# Patient Record
Sex: FEMALE | HISPANIC OR LATINO | Employment: UNEMPLOYED | URBAN - METROPOLITAN AREA
[De-identification: names, ages, dates, MRNs, and addresses within clinical notes are randomized per-mention and may not be internally consistent; named-entity substitution may affect disease eponyms.]

---

## 2022-06-24 ENCOUNTER — TELEPHONE (OUTPATIENT)
Dept: OBGYN | Facility: CLINIC | Age: 32
End: 2022-06-24

## 2022-06-24 DIAGNOSIS — Z32.01 PREGNANCY TEST POSITIVE: Primary | ICD-10-CM

## 2022-06-24 NOTE — TELEPHONE ENCOUNTER
[facility] Call [facility]    Phone Message    May a detailed message be left on voicemail: yes     Reason for Call: Order(s): Other:   Reason for requested: US new obi  Date needed: 7/1/22  Provider name: Linda Grayson      Action Taken: Message routed to:  [facility] ([facility]): meera    Travel Screening: Not Applicable

## 2022-07-05 ENCOUNTER — TELEPHONE (OUTPATIENT)
Dept: OBGYN | Facility: CLINIC | Age: 32
End: 2022-07-05

## 2022-07-21 ENCOUNTER — OFFICE VISIT (OUTPATIENT)
Dept: OBGYN | Facility: CLINIC | Age: 32
End: 2022-07-21
Attending: MIDWIFE
Payer: COMMERCIAL

## 2022-07-21 ENCOUNTER — ANCILLARY PROCEDURE (OUTPATIENT)
Dept: ULTRASOUND IMAGING | Facility: CLINIC | Age: 32
End: 2022-07-21
Attending: MIDWIFE
Payer: COMMERCIAL

## 2022-07-21 VITALS
HEIGHT: 62 IN | HEART RATE: 77 BPM | SYSTOLIC BLOOD PRESSURE: 120 MMHG | BODY MASS INDEX: 27.33 KG/M2 | DIASTOLIC BLOOD PRESSURE: 86 MMHG | WEIGHT: 148.5 LBS

## 2022-07-21 DIAGNOSIS — Z32.01 PREGNANCY TEST POSITIVE: ICD-10-CM

## 2022-07-21 DIAGNOSIS — Z34.91 NORMAL PREGNANCY IN FIRST TRIMESTER: Primary | ICD-10-CM

## 2022-07-21 DIAGNOSIS — Z32.01 PREGNANCY TEST POSITIVE: Primary | ICD-10-CM

## 2022-07-21 PROCEDURE — G0463 HOSPITAL OUTPT CLINIC VISIT: HCPCS

## 2022-07-21 PROCEDURE — 99207 PR PRENATAL VISIT: CPT | Performed by: ADVANCED PRACTICE MIDWIFE

## 2022-07-21 PROCEDURE — 76801 OB US < 14 WKS SINGLE FETUS: CPT

## 2022-07-21 PROCEDURE — 76801 OB US < 14 WKS SINGLE FETUS: CPT | Mod: 26 | Performed by: OBSTETRICS & GYNECOLOGY

## 2022-07-21 RX ORDER — PYRIDOXINE HCL (VITAMIN B6) 25 MG
25 TABLET ORAL DAILY
Qty: 30 TABLET | Refills: 3 | Status: SHIPPED | OUTPATIENT
Start: 2022-07-21 | End: 2022-11-18

## 2022-07-21 RX ORDER — PRENATAL VIT/IRON FUM/FOLIC AC 27MG-0.8MG
1 TABLET ORAL DAILY
COMMUNITY

## 2022-07-21 RX ORDER — CALCIUM CARBONATE 500 MG/1
1 TABLET, CHEWABLE ORAL 2 TIMES DAILY
Qty: 20 TABLET | Refills: 1 | Status: ON HOLD | OUTPATIENT
Start: 2022-07-21 | End: 2023-02-03

## 2022-07-21 NOTE — LETTER
Date:July 25, 2022      Patient was self referred, no letter generated. Do not send.        [facility] Health Information

## 2022-07-21 NOTE — PROGRESS NOTES
[facility] OB Intake note  Subjective   32 year old femalepresents to clinic for initiation of OB care. 5/8/22  No obstetric history on file. at Unknown by Estimated Date of Delivery: 2/12/22 based on LMP. Reviewed dating ultrasound. Pregnancy is planned. Taking a prenatal: [employer].  present along with  Jaxson at todays appointment. BOth speak a lot of english.  New to [place]    Partner name - Jaxson () From [place].       Symptoms since LMP include nausea and breast tenderness. Patient has tried these relief measures: increased rest and increased fluids.    - Genetic/Infection questionnaire completed, risks include . Pt  does not have a recent known exposure to Parvo or CMV so IgG/IgM testing WILL NOT be ordered.   Recommended Flu Vaccine.  Patient declined, will consider next visit  - Current Medications   Current Outpatient Medications   Medication Sig Dispense Refill     calcium carbonate (TUMS) 500 MG chewable tablet Take 1 tablet (500 mg) by mouth 2 times daily 20 tablet 1     doxylamine (UNISOM SLEEPTABS) 25 MG TABS tablet Take 0.5 tablets (12.5 mg) by mouth At Bedtime 30 tablet 3     Prenatal Vit-Fe Fumarate-FA (PRENATAL MULTIVITAMIN W/IRON) 27-0.8 MG tablet Take 1 tablet by mouth daily       pyridOXINE (VITAMIN B6) 25 MG tablet Take 1 tablet (25 mg) by mouth daily 30 tablet 3       Only taking prenatal and was previously  taking Magaldrate (anti acid) when she was in [place]. Came back from [place] vacation yesterday and has not taken it.   - Co-morbids:   Past Medical History:   Diagnosis Date     Varicose veins of lower extremity 2008     - Risk for GDM : No 1st degree relatives with DM or GDM. WILL NOT have an early GCT and Hgb A1C.   Grandfather diagnosed with diabetes at age 70.     - High risk factors for Pre E-  No known risk factors of High risk for Pre E     Pregnant individuals at high risk of preeclampsia with one or more of the following risk factors:  History of  preeclampsia, especially when accompanied by an adverse outcome  Multifetal gestation  Chronic hypertension  Pregestational type 1 or 2 diabetes  Kidney disease  Autoimmune disease (ie, systemic lupus erythematous, antiphospholipid syndrome)  Combinations of multiple moderate-risk factors    - Moderate risk factor for Pre E Sociodemographic characteristics (Racism, Less access given lower SES)  Meets no high risk factors or one of the moderate risk facrtors  Nulliparity  Obesity (ie, body mass index > 30)  Family history of preeclampsia (ie, mother or sister)  Black race (as a proxy for underlying racism)  Lower income  Age 35 years or older  Personal history factors (eg, low birth weight or small for gestational age, previous adverse pregnancy outcome, >10-year pregnancy interval)  In vitro fertilization  so WILL NOT consider starting low dose aspirin (81mg) starting between 12 and 28 weeks to prevent early onset preeclampsia      - The patient  does not have a history of spontaneous  birth so  WILL NOT consider progesterone starting at 16-20 weeks and/or serial transvaginal cervical length ultrasounds from 16-24 weeks.     -The patient does not have a history of immunosuppresion or HIV so Toxoplasma IgG/IgM WILL NOT be ordered.    -Assess risk for asymptomatic latent TB (prior infection, recent immigrant from epidemic areas, immunosuppression, living in overcrowded environment):   WILL NOT have PPD skin test or Quantiferon-TB Gold Plus blood draw.    PERSONAL/SOCIAL HISTORY    with partner and Nigerian bull dog.   Employment: a .  Job involves light activity.  Her partner works as a Food industry, factory work. Exposed to soap.   History of anxiety or depression - not self. Family history: Mom has anxiety and grandmother had depression.   Therapy/medication/hospitalization.   Additional items: None    Objective  -VS: reviewed and within normal limits   -General appearance: no acute distress,  patient is comfortable   NEUROLOGICAL/PSYCHIATRIC   - Orientated x3,   -Mood and affect: : normal     Assessment/Plan  Elham was seen today for prenatal care.    Diagnoses and all orders for this visit:    Normal pregnancy in first trimester  -     25- OH-Vitamin D; Future  -     ABO/Rh Type and Screen; Future  -     CBC with Platelets Differential; Future  -     Cancel: Chlamydia by PCR; Future  -     Cancel: Gonorrhorea by PCR; Future  -     Hepatitis B Surface Antigen; Future  -     Routine UA with Microscopic; Future  -     HIV Antigen Antibody Combo; Future  -     Rubella Antibody IgG; Future  -     Treponema Abs w Reflex to RPR and Titer; Future  -     Hepatitis C antibody; Future  -     calcium carbonate (TUMS) 500 MG chewable tablet; Take 1 tablet (500 mg) by mouth 2 times daily  -     pyridOXINE (VITAMIN B6) 25 MG tablet; Take 1 tablet (25 mg) by mouth daily  -     doxylamine (UNISOM SLEEPTABS) 25 MG TABS tablet; Take 0.5 tablets (12.5 mg) by mouth At Bedtime  -     [facility] Fetal Med [facility]tr Referral - Pregnancy; Future      32 year old No obstetric history on file. 10 weeks 4 days of pregnancy with MAGGIE of Feb 12, 2023 by LMP of Patient's last menstrual period was 05/08/2022 (exact date).. Ultrasound confirms.   Outpatient Encounter Medications as of 7/21/2022   Medication Sig Dispense Refill     calcium carbonate (TUMS) 500 MG chewable tablet Take 1 tablet (500 mg) by mouth 2 times daily 20 tablet 1     doxylamine (UNISOM SLEEPTABS) 25 MG TABS tablet Take 0.5 tablets (12.5 mg) by mouth At Bedtime 30 tablet 3     Prenatal Vit-Fe Fumarate-FA (PRENATAL MULTIVITAMIN W/IRON) 27-0.8 MG tablet Take 1 tablet by mouth daily       pyridOXINE (VITAMIN B6) 25 MG tablet Take 1 tablet (25 mg) by mouth daily 30 tablet 3     No facility-administered encounter medications on file as of 7/21/2022.      Orders Placed This Encounter   Procedures     25- OH-Vitamin D     Hepatitis B Surface Antigen     Routine UA with Microscopic      HIV Antigen Antibody Combo     Rubella Antibody IgG     Treponema Abs w Reflex to RPR and Titer     Hepatitis C antibody     Mat Fetal Med Ctr Referral - Pregnancy                 - Oriented to Practice, types of care, and how to reach a provider.  Pt prefers CNM team  - Patient received 1st trimester new OB education packet complete with aide of The Expectant Family booklet including information on genetic screening test options.  - Patient desires 1st trimester screening which will be ordered at 1st OB exam.  - Educational handout on the prevention of infections diseases during pregnancy provided.  - Patient was encouraged to start prenatal vitamins as tolerated.    - Patient was sent to lab for routine OB labs for OB intake.   - Pregnancy concerns to be addressed by provider at new OB exam include: none.  - Ordered tums for heartburn   - Ordered Unisom and Vitamin B6 for nausea and encouraged small frequent meals with increased hydration.   Pt to RTO for NOB visit in 4 weeks and prn if questions or concerns    Meri Lozano, FIDENCIO CODY  I, Danelle Lomeli, am serving as a scribe; to document services personally performed by Geetha Lozano CNM based on data collection and the provider's statements to me.     Danelle Lomeli NP Student   I agree with the PFSH and ROS as completed by the student, except for changes made by me. The remainder of the encounter was performed by me and scribed by the student. The scribed note accurately reflects my personal services and decisions made by me.  Meri Lozano DNP, JIMM, APRN

## 2022-07-21 NOTE — LETTER
7/21/2022       RE: Elham Hansen  [address]  [facility] [address]     Dear Colleague,    Thank you for referring your patient, Elham Hansen, to the [facility] [place] at [facility]. Please see a copy of my visit note below.    [facility] OB Intake note  Subjective   32 year old femalepresents to clinic for initiation of OB care. 5/8/22  No obstetric history on file. at Unknown by Estimated Date of Delivery: 2/12/22 based on LMP. Reviewed dating ultrasound. Pregnancy is planned. Taking a prenatal: [employer].  present along with  Jaxson at todays appointment. BOth speak a lot of english.  New to [place]    Partner name - Jaxson () From [place].       Symptoms since LMP include nausea and breast tenderness. Patient has tried these relief measures: increased rest and increased fluids.    - Genetic/Infection questionnaire completed, risks include . Pt  does not have a recent known exposure to Parvo or CMV so IgG/IgM testing WILL NOT be ordered.   Recommended Flu Vaccine.  Patient declined, will consider next visit  - Current Medications   Current Outpatient Medications   Medication Sig Dispense Refill     calcium carbonate (TUMS) 500 MG chewable tablet Take 1 tablet (500 mg) by mouth 2 times daily 20 tablet 1     doxylamine (UNISOM SLEEPTABS) 25 MG TABS tablet Take 0.5 tablets (12.5 mg) by mouth At Bedtime 30 tablet 3     Prenatal Vit-Fe Fumarate-FA (PRENATAL MULTIVITAMIN W/IRON) 27-0.8 MG tablet Take 1 tablet by mouth daily       pyridOXINE (VITAMIN B6) 25 MG tablet Take 1 tablet (25 mg) by mouth daily 30 tablet 3       Only taking prenatal and was previously  taking Magaldrate (anti acid) when she was in [place]. Came back from [place] vacation yesterday and has not taken it.   - Co-morbids:   Past Medical History:   Diagnosis Date     Varicose veins of lower extremity 2008     - Risk for GDM : No 1st degree  relatives with DM or GDM. WILL NOT have an early GCT and Hgb A1C.   Grandfather diagnosed with diabetes at age 70.     - High risk factors for Pre E-  No known risk factors of High risk for Pre E     Pregnant individuals at high risk of preeclampsia with one or more of the following risk factors:  History of preeclampsia, especially when accompanied by an adverse outcome  Multifetal gestation  Chronic hypertension  Pregestational type 1 or 2 diabetes  Kidney disease  Autoimmune disease (ie, systemic lupus erythematous, antiphospholipid syndrome)  Combinations of multiple moderate-risk factors    - Moderate risk factor for Pre E Sociodemographic characteristics (Racism, Less access given lower SES)  Meets no high risk factors or one of the moderate risk facrtors  Nulliparity  Obesity (ie, body mass index > 30)  Family history of preeclampsia (ie, mother or sister)  Black race (as a proxy for underlying racism)  Lower income  Age 35 years or older  Personal history factors (eg, low birth weight or small for gestational age, previous adverse pregnancy outcome, >10-year pregnancy interval)  In vitro fertilization  so WILL NOT consider starting low dose aspirin (81mg) starting between 12 and 28 weeks to prevent early onset preeclampsia      - The patient  does not have a history of spontaneous  birth so  WILL NOT consider progesterone starting at 16-20 weeks and/or serial transvaginal cervical length ultrasounds from 16-24 weeks.     -The patient does not have a history of immunosuppresion or HIV so Toxoplasma IgG/IgM WILL NOT be ordered.    -Assess risk for asymptomatic latent TB (prior infection, recent immigrant from epidemic areas, immunosuppression, living in overcrowded environment):   WILL NOT have PPD skin test or Quantiferon-TB Gold Plus blood draw.    PERSONAL/SOCIAL HISTORY    with partner and Pashto bull dog.   Employment: a .  Job involves light activity.  Her partner works as a Food  industry, factory work. Exposed to soap.   History of anxiety or depression - not self. Family history: Mom has anxiety and grandmother had depression.   Therapy/medication/hospitalization.   Additional items: None    Objective  -VS: reviewed and within normal limits   -General appearance: no acute distress, patient is comfortable   NEUROLOGICAL/PSYCHIATRIC   - Orientated x3,   -Mood and affect: : normal     Assessment/Plan  Elham was seen today for prenatal care.    Diagnoses and all orders for this visit:    Normal pregnancy in first trimester  -     25- OH-Vitamin D; Future  -     ABO/Rh Type and Screen; Future  -     CBC with Platelets Differential; Future  -     Cancel: Chlamydia by PCR; Future  -     Cancel: Gonorrhorea by PCR; Future  -     Hepatitis B Surface Antigen; Future  -     Routine UA with Microscopic; Future  -     HIV Antigen Antibody Combo; Future  -     Rubella Antibody IgG; Future  -     Treponema Abs w Reflex to RPR and Titer; Future  -     Hepatitis C antibody; Future  -     calcium carbonate (TUMS) 500 MG chewable tablet; Take 1 tablet (500 mg) by mouth 2 times daily  -     pyridOXINE (VITAMIN B6) 25 MG tablet; Take 1 tablet (25 mg) by mouth daily  -     doxylamine (UNISOM SLEEPTABS) 25 MG TABS tablet; Take 0.5 tablets (12.5 mg) by mouth At Bedtime  -     Mat Fetal Med Ctr Referral - Pregnancy; Future      32 year old No obstetric history on file. 10 weeks 4 days of pregnancy with MAGGIE of Feb 12, 2023 by LMP of Patient's last menstrual period was 05/08/2022 (exact date).. Ultrasound confirms.   Outpatient Encounter Medications as of 7/21/2022   Medication Sig Dispense Refill     calcium carbonate (TUMS) 500 MG chewable tablet Take 1 tablet (500 mg) by mouth 2 times daily 20 tablet 1     doxylamine (UNISOM SLEEPTABS) 25 MG TABS tablet Take 0.5 tablets (12.5 mg) by mouth At Bedtime 30 tablet 3     Prenatal Vit-Fe Fumarate-FA (PRENATAL MULTIVITAMIN W/IRON) 27-0.8 MG tablet Take 1 tablet by mouth  daily       pyridOXINE (VITAMIN B6) 25 MG tablet Take 1 tablet (25 mg) by mouth daily 30 tablet 3     No facility-administered encounter medications on file as of 7/21/2022.      Orders Placed This Encounter   Procedures     25- OH-Vitamin D     Hepatitis B Surface Antigen     Routine UA with Microscopic     HIV Antigen Antibody Combo     Rubella Antibody IgG     Treponema Abs w Reflex to RPR and Titer     Hepatitis C antibody     Mat Fetal Med Ctr Referral - Pregnancy                 - Oriented to Practice, types of care, and how to reach a provider.  Pt prefers CNM team  - Patient received 1st trimester new OB education packet complete with aide of The Expectant Family booklet including information on genetic screening test options.  - Patient desires 1st trimester screening which will be ordered at 1st OB exam.  - Educational handout on the prevention of infections diseases during pregnancy provided.  - Patient was encouraged to start prenatal vitamins as tolerated.    - Patient was sent to lab for routine OB labs for OB intake.   - Pregnancy concerns to be addressed by provider at new OB exam include: none.  - Ordered tums for heartburn   - Ordered Unisom and Vitamin B6 for nausea and encouraged small frequent meals with increased hydration.   Pt to RTO for NOB visit in 4 weeks and prn if questions or concerns    Meri Lozano, FIDENCIO CODY  I, Danelle Lomeli, am serving as a scribe; to document services personally performed by Geetha Lozano CNM based on data collection and the provider's statements to me.     Danelle Lomeli [facility] Student   I agree with the PFSH and ROS as completed by the student, except for changes made by me. The remainder of the encounter was performed by me and scribed by the student. The scribed note accurately reflects my personal services and decisions made by me.  Meri Lozano, AISHA, JIMM, APRN                                    Again, thank you for allowing me to participate in the  care of your patient.      Sincerely,    Meri Lozano, FIDENCIO CODY

## 2022-07-22 ENCOUNTER — LAB (OUTPATIENT)
Dept: LAB | Facility: CLINIC | Age: 32
End: 2022-07-22
Payer: COMMERCIAL

## 2022-07-22 DIAGNOSIS — Z34.91 NORMAL PREGNANCY IN FIRST TRIMESTER: ICD-10-CM

## 2022-07-22 LAB
ABO/RH(D): NORMAL
ALBUMIN UR-MCNC: NEGATIVE MG/DL
ANTIBODY SCREEN: NEGATIVE
APPEARANCE UR: CLEAR
BACTERIA #/AREA URNS HPF: ABNORMAL /HPF
BASOPHILS # BLD AUTO: 0.1 10E3/UL (ref 0–0.2)
BASOPHILS NFR BLD AUTO: 1 %
BILIRUB UR QL STRIP: NEGATIVE
COLOR UR AUTO: YELLOW
EOSINOPHIL # BLD AUTO: 0.1 10E3/UL (ref 0–0.7)
EOSINOPHIL NFR BLD AUTO: 1 %
ERYTHROCYTE [DISTWIDTH] IN BLOOD BY AUTOMATED COUNT: 12.3 % (ref 10–15)
GLUCOSE UR STRIP-MCNC: NEGATIVE MG/DL
HCT VFR BLD AUTO: 38.3 % (ref 35–47)
HGB BLD-MCNC: 13.7 G/DL (ref 11.7–15.7)
HGB UR QL STRIP: NEGATIVE
HOLD SPECIMEN: NORMAL
IMM GRANULOCYTES # BLD: 0.1 10E3/UL
IMM GRANULOCYTES NFR BLD: 1 %
KETONES UR STRIP-MCNC: NEGATIVE MG/DL
LEUKOCYTE ESTERASE UR QL STRIP: NEGATIVE
LYMPHOCYTES # BLD AUTO: 2.5 10E3/UL (ref 0.8–5.3)
LYMPHOCYTES NFR BLD AUTO: 26 %
MCH RBC QN AUTO: 30.3 PG (ref 26.5–33)
MCHC RBC AUTO-ENTMCNC: 35.8 G/DL (ref 31.5–36.5)
MCV RBC AUTO: 85 FL (ref 78–100)
MONOCYTES # BLD AUTO: 0.7 10E3/UL (ref 0–1.3)
MONOCYTES NFR BLD AUTO: 8 %
MUCOUS THREADS #/AREA URNS LPF: PRESENT /LPF
NEUTROPHILS # BLD AUTO: 6.3 10E3/UL (ref 1.6–8.3)
NEUTROPHILS NFR BLD AUTO: 63 %
NITRATE UR QL: NEGATIVE
NRBC # BLD AUTO: 0 10E3/UL
NRBC BLD AUTO-RTO: 0 /100
PH UR STRIP: 5.5 [PH] (ref 5–7)
PLATELET # BLD AUTO: 332 10E3/UL (ref 150–450)
RBC # BLD AUTO: 4.52 10E6/UL (ref 3.8–5.2)
RBC URINE: 1 /HPF
SP GR UR STRIP: 1.02 (ref 1–1.03)
SPECIMEN EXPIRATION DATE: NORMAL
SQUAMOUS EPITHELIAL: 2 /HPF
T PALLIDUM AB SER QL: NONREACTIVE
UROBILINOGEN UR STRIP-MCNC: NORMAL MG/DL
WBC # BLD AUTO: 9.8 10E3/UL (ref 4–11)
WBC URINE: 2 /HPF

## 2022-07-22 PROCEDURE — 36415 COLL VENOUS BLD VENIPUNCTURE: CPT

## 2022-07-22 PROCEDURE — 85025 COMPLETE CBC W/AUTO DIFF WBC: CPT

## 2022-07-22 PROCEDURE — 87591 N.GONORRHOEAE DNA AMP PROB: CPT

## 2022-07-22 PROCEDURE — 86780 TREPONEMA PALLIDUM: CPT

## 2022-07-22 PROCEDURE — 86901 BLOOD TYPING SEROLOGIC RH(D): CPT

## 2022-07-22 PROCEDURE — 82306 VITAMIN D 25 HYDROXY: CPT

## 2022-07-22 PROCEDURE — 81001 URINALYSIS AUTO W/SCOPE: CPT

## 2022-07-22 PROCEDURE — 87389 HIV-1 AG W/HIV-1&-2 AB AG IA: CPT

## 2022-07-22 PROCEDURE — 87491 CHLMYD TRACH DNA AMP PROBE: CPT

## 2022-07-22 PROCEDURE — 86803 HEPATITIS C AB TEST: CPT

## 2022-07-22 PROCEDURE — 87340 HEPATITIS B SURFACE AG IA: CPT

## 2022-07-22 PROCEDURE — 86762 RUBELLA ANTIBODY: CPT

## 2022-07-23 LAB
C TRACH DNA SPEC QL NAA+PROBE: NEGATIVE
N GONORRHOEA DNA SPEC QL NAA+PROBE: NEGATIVE

## 2022-07-24 ENCOUNTER — HEALTH MAINTENANCE LETTER (OUTPATIENT)
Age: 32
End: 2022-07-24

## 2022-07-24 LAB
DEPRECATED CALCIDIOL+CALCIFEROL SERPL-MC: 27 UG/L (ref 20–75)
HBV SURFACE AG SERPL QL IA: NONREACTIVE
HCV AB SERPL QL IA: NONREACTIVE
HIV 1+2 AB+HIV1 P24 AG SERPL QL IA: NONREACTIVE

## 2022-07-25 ENCOUNTER — TRANSCRIBE ORDERS (OUTPATIENT)
Dept: MATERNAL FETAL MEDICINE | Facility: CLINIC | Age: 32
End: 2022-07-25

## 2022-07-25 DIAGNOSIS — O26.90 PREGNANCY RELATED CONDITION, ANTEPARTUM: Primary | ICD-10-CM

## 2022-07-25 LAB
RUBV IGG SERPL QL IA: 2.63 INDEX
RUBV IGG SERPL QL IA: POSITIVE

## 2022-08-03 ENCOUNTER — PRE VISIT (OUTPATIENT)
Dept: MATERNAL FETAL MEDICINE | Facility: CLINIC | Age: 32
End: 2022-08-03

## 2022-08-05 ENCOUNTER — OFFICE VISIT (OUTPATIENT)
Dept: MATERNAL FETAL MEDICINE | Facility: CLINIC | Age: 32
End: 2022-08-05
Attending: ADVANCED PRACTICE MIDWIFE
Payer: COMMERCIAL

## 2022-08-05 ENCOUNTER — LAB (OUTPATIENT)
Dept: LAB | Facility: CLINIC | Age: 32
End: 2022-08-05
Attending: ADVANCED PRACTICE MIDWIFE
Payer: COMMERCIAL

## 2022-08-05 ENCOUNTER — HOSPITAL ENCOUNTER (OUTPATIENT)
Dept: ULTRASOUND IMAGING | Facility: CLINIC | Age: 32
Discharge: HOME | End: 2022-08-05
Attending: ADVANCED PRACTICE MIDWIFE
Payer: COMMERCIAL

## 2022-08-05 DIAGNOSIS — Z36.3 SCREENING, ANTENATAL, FOR MALFORMATION BY ULTRASOUND: ICD-10-CM

## 2022-08-05 DIAGNOSIS — Z34.91 NORMAL PREGNANCY IN FIRST TRIMESTER: ICD-10-CM

## 2022-08-05 DIAGNOSIS — O26.90 PREGNANCY RELATED CONDITION, ANTEPARTUM: ICD-10-CM

## 2022-08-05 DIAGNOSIS — Z36.9 PRENATAL SCREENING ENCOUNTER: Primary | ICD-10-CM

## 2022-08-05 DIAGNOSIS — Z34.01 ENCOUNTER FOR SUPERVISION OF LOW-RISK FIRST PREGNANCY IN FIRST TRIMESTER: ICD-10-CM

## 2022-08-05 DIAGNOSIS — Z34.91 NORMAL PREGNANCY IN FIRST TRIMESTER: Primary | ICD-10-CM

## 2022-08-05 DIAGNOSIS — Z36.9 PRENATAL SCREENING ENCOUNTER: ICD-10-CM

## 2022-08-05 PROCEDURE — 76813 OB US NUCHAL MEAS 1 GEST: CPT

## 2022-08-05 PROCEDURE — 36415 COLL VENOUS BLD VENIPUNCTURE: CPT

## 2022-08-05 PROCEDURE — 76813 OB US NUCHAL MEAS 1 GEST: CPT | Mod: 26 | Performed by: OBSTETRICS & GYNECOLOGY

## 2022-08-05 PROCEDURE — 96040 HC GENETIC COUNSELING, EACH 30 MINUTES: CPT | Performed by: GENETIC COUNSELOR, MS

## 2022-08-05 NOTE — PROGRESS NOTES
"Please see \"Imaging\" tab under \"Chart Review\" for details of today's visit.    Mariama Medel MD PhD  Maternal Fetal Medicine     "

## 2022-08-05 NOTE — PROGRESS NOTES
[facility]  Genetic Counseling Consult    Patient: Elham Hansen YOB: 1990   Date of Service: 22      Elham Hansen was seen at [facility] for genetic consultation to discuss the options for routine screening for fetal chromosome abnormalities. Elham was accompanied to today's consult by her , Jaxson. Parts of today's consult was facilitated with the help of an iPad  (clarification of family history relations). The vast majority of today's consult was facilitated without the help of a , at the couple's request, as Elham and Jaxson understand English very well. The iPad  remained available for the couple to freely utilize during today's consult.       Impression/Plan:   1. Elham elected to proceed with NIPS through [facility] and opted to screen for sex chromosome aneuploidies, but declines reporting of fetal sex. Results are expected in 7-10 business days. [facility] genetic counseling will leave a callback number only on the patient's voicemail should we be unable to get ahold of Elham for result review. Patient was informed that results (including predicted fetal sex) will also be available via [facility].    2. Maternal serum AFP (single marker screen) is recommended after 15 weeks to screen for open neural tube defects. A quad screen should not be performed.    3.  Elham had an NT ultrasound today, please see the ultrasound report for details.    Pregnancy History:   /Parity:     Age at Delivery: 33 year old  MAGGIE: 2023, by Last Menstrual Period  Gestational Age: 12w5d    No significant complications or exposures were reported in the current pregnancy.    Medical History:   Elham s reported medical history is not expected to impact pregnancy management or risks to fetal development.       Family History:   A three-generation pedigree was obtained, and is scanned  under the  Media  tab.     The following significant findings were reported by Elham:    Jaxson: reported to be in good health today with no children from previous partners.     Jaxson's maternal female first cousin: reported to have intellectual disabilities related to school work. She has otherwise met her developmental milestones, is social with others, and in good health in her early 20s. Etiology for her struggles with school work have not been identified. We discussed that should there be a genetic etiology for her learning challenges, it would be unlikely to directly impact the current pregnancy due to this cousin being a 4th degree relative to the current pregnancy. The couple verbalized understanding.     Jaxson's mother: one spontaneous miscarriage. Her only living child is Jaxson.     Otherwise, the reported family history is negative for multiple miscarriages, cancer diagnosed under the age of 50, stillbirths, birth defects, known genetic conditions, and consanguinity.       Carrier Screening:       Expanded carrier screening for mutations in a large panel of genes associated with autosomal recessive conditions including cystic fibrosis, spinal muscular atrophy, and others, is now available.      Carrier screening was not discussed today due to time constraints.       Risk Assessment for Chromosome Conditions:   We explained that the risk for fetal chromosome abnormalities increases with maternal age. We discussed specific features of common chromosome abnormalities, including Down syndrome, trisomy 13, trisomy 18, and sex chromosome trisomies.      - At age 32 at midtrimester, the risk to have a baby with Down syndrome is 1 in 690.     - At age 32 at midtrimester, the risk to have a baby with any chromosome abnormality is 1 in 345.     - At age 33 at delivery, the risk to have a baby with Down syndrome is 1 in 909.     - At age 33 at delivery, the risk to have a baby with any chromosome abnormality is 1 in  355.        Testing Options:   We discussed the following options:   Non-invasive Prenatal Testing (NIPT)    Maternal plasma cell-free DNA testing; first trimester ultrasound with nuchal translucency and nasal bone assessment is recommended, when appropriate    Screens for fetal trisomy 21, trisomy 13, trisomy 18, and sex chromosome aneuploidy    Cannot screen for open neural tube defects; maternal serum AFP after 15 weeks is recommended  This test has primarily been validated in woman at increased risk for a chromosome problem in a pregnancy (e.g. abnormal screening results, abnormal ultrasound findings, advanced maternal age). In addition, we reviewed that many insurance companies may not cover this test in women who are at low risk to have a baby with a chromosome problem. Some low risk women do still choose this option. In this situation, they are aware that insurance may not cover the cost of testing. The benefits and limitations of this screen in the context of a low risk pregnancy were discussed. The patient reports she is comfortable with proceeding with NIPT analysis at this time.       Chorionic villus sampling (CVS)    Invasive procedure typically performed in the first trimester by which placental villi are obtained for the purpose of chromosome analysis and/or other prenatal genetic analysis    Diagnostic results; >99% sensitivity for fetal chromosome abnormalities    Cannot test for open neural tube defects; maternal serum AFP after 15 weeks is recommended       Genetic Amniocentesis    Invasive procedure typically performed in the second trimester by which amniotic fluid is obtained for the purpose of chromosome analysis and/or other prenatal genetic analysis    Diagnostic results; >99% sensitivity for fetal chromosome abnormalities    AFAFP measurement tests for open neural tube defects    NT Ultrasound     Assessment for the thickness of the nuchal translucency and the fetus' nasal bone performed  between [id number] gestation    ~70% aneuploidy detection      We reviewed the benefits and limitations of this testing.  Screening tests provide a risk assessment specific to the pregnancy for certain fetal chromosome abnormalities, but cannot definitively diagnose or exclude a fetal chromosome abnormality.  Follow-up genetic counseling and consideration of diagnostic testing is recommended with any abnormal screening result.      It was a pleasure to be involved with Elham s care. Face-to-face time of the meeting was 30 minutes.    Elissa Parsons MS, [facility]  Genetic Counselor  [facility]  Maternal Fetal Medicine  Ph: [phone]   Pager: [phone]

## 2022-08-08 PROBLEM — Z34.90 SUPERVISION OF NORMAL PREGNANCY: Status: ACTIVE | Noted: 2022-08-08

## 2022-08-12 ENCOUNTER — TELEPHONE (OUTPATIENT)
Dept: MATERNAL FETAL MEDICINE | Facility: CLINIC | Age: 32
End: 2022-08-12

## 2022-08-12 LAB — SCANNED LAB RESULT: NORMAL

## 2022-08-12 NOTE — TELEPHONE ENCOUNTER
"August 12, 2022    Left a message for the patient, sharing that I have \"reassuring news\" to share regarding test results, and encouraged her to call me back at her earliest convenience.        Elissa Parsons MS, [facility]  Genetic Counselor  [facility]  Maternal Fetal Medicine  Ph: [phone]      "

## 2022-08-16 ENCOUNTER — TELEPHONE (OUTPATIENT)
Dept: MATERNAL FETAL MEDICINE | Facility: CLINIC | Age: 32
End: 2022-08-16

## 2022-08-16 NOTE — TELEPHONE ENCOUNTER
August 16, 2022    I spoke with Elham regarding her NIPT results.     [facility] NIPS screening results indicate NO ANEUPLOIDY DETECTED for chromosomes 21, 18, 13, or sex chromosomes. Per the patient's request during our initial genetic counseling consult, the predicted genetic sex of the pregnancy was NOT shared over the phone today. The patient is aware that predicted fetal sex is on the test report, and knows to not open her results in MyChart until she is ready to lear predicted fetal sex. Elham verbalized understanding.     These results put the patient's current pregnancy at low risk for Down syndrome, trisomy 18, trisomy 13 and sex chromosome abnormalities.This test is reported to have the following sensitivities: Down syndrome- 99.99%, trisomy 18- 99.99%, trisomy 13- 99.99%, XX sex chromosomes- 98.33%, XY sex chromosomes- 99.99%.  Although these results are reassuring, this does not replace a standard chromosome analysis from a chorionic villus sampling or amniocentesis. The patient has declined proceeding with diagnostic invasive prenatal testing at this time.    MSAFP is the appropriate second trimester screening test for open neural tube defects; the maternal quad screen is not recommended. Her results are available in her Epic chart for review and will be forwarded to her primary OB.       Elissa Parsons MS, [facility]  Genetic Counselor  [facility]  Maternal Fetal Medicine  Ph: [phone]

## 2022-08-19 ENCOUNTER — OFFICE VISIT (OUTPATIENT)
Dept: OBGYN | Facility: CLINIC | Age: 32
End: 2022-08-19
Attending: ADVANCED PRACTICE MIDWIFE
Payer: COMMERCIAL

## 2022-08-19 VITALS
SYSTOLIC BLOOD PRESSURE: 106 MMHG | WEIGHT: 145 LBS | BODY MASS INDEX: 26.68 KG/M2 | HEART RATE: 67 BPM | HEIGHT: 62 IN | DIASTOLIC BLOOD PRESSURE: 79 MMHG

## 2022-08-19 DIAGNOSIS — Z34.00 SUPERVISION OF NORMAL FIRST PREGNANCY, ANTEPARTUM: ICD-10-CM

## 2022-08-19 DIAGNOSIS — Z34.91 NORMAL PREGNANCY IN FIRST TRIMESTER: Primary | ICD-10-CM

## 2022-08-19 PROCEDURE — G0463 HOSPITAL OUTPT CLINIC VISIT: HCPCS

## 2022-08-19 PROCEDURE — 99207 PR PRENATAL VISIT: CPT | Performed by: ADVANCED PRACTICE MIDWIFE

## 2022-08-19 ASSESSMENT — PAIN SCALES - GENERAL: PAINLEVEL: NO PAIN (0)

## 2022-08-19 NOTE — LETTER
2022       RE: Elham Hansen  [address]  [facility] [address]     Dear Colleague,    Thank you for referring your patient, Elham Hansen, to the [facility] [place] at [facility]. Please see a copy of my visit note below.    SUBJECTIVE:   Elham is a 32 year old female who presents to clinic for a new OB visit.   at 14w5d with Estimated Date of Delivery: 2023 based on LMP. Feels well. Has started PNV.     She has not had bleeding since her LMP.   She has had mild nausea. Weight loss has occurred, for a total of 3 pounds.   This was a planned pregnancy.   Partner is involved,  Jaxson, accompanies her to visit today   OTHER CONCERNS: none today    ===========================================   ROS: 10 point ROS neg other than the symptoms noted above in the HPI.      PSYCHIATRIC:  Denies mood changes.        Past History:  Her past medical history   Past Medical History:   Diagnosis Date     Varicose veins of lower extremity    .   This is her first pregnancy  Since her last LMP she denies use of alcohol, tobacco and street drugs.  HISTORY:  Family History   Problem Relation Age of Onset     Anxiety Disorder Mother      Prostate Cancer Maternal Grandfather      Other Cancer Maternal Grandfather      Hypertension Maternal Grandmother      Depression Maternal Grandmother      Diabetes Paternal Grandfather      Hypertension Paternal Grandmother      Social History     Socioeconomic History     Marital status:      Spouse name: None     Number of children: None     Years of education: None     Highest education level: None   Tobacco Use     Smoking status: Never Smoker     Smokeless tobacco: Never Used   Substance and Sexual Activity     Alcohol use: Not Currently     Drug use: Never     Sexual activity: Yes     Partners: Male     Birth control/protection: None     Current Outpatient Medications   Medication Sig  "    doxylamine (UNISOM SLEEPTABS) 25 MG TABS tablet Take 0.5 tablets (12.5 mg) by mouth At Bedtime     Prenatal Vit-Fe Fumarate-FA (PRENATAL MULTIVITAMIN W/IRON) 27-0.8 MG tablet Take 1 tablet by mouth daily     pyridOXINE (VITAMIN B6) 25 MG tablet Take 1 tablet (25 mg) by mouth daily     calcium carbonate (TUMS) 500 MG chewable tablet Take 1 tablet (500 mg) by mouth 2 times daily (Patient not taking: Reported on 2022)     No current facility-administered medications for this visit.     Allergies   Allergen Reactions     Misc. Sulfonamide Containing Compounds Dermatitis and Rash     Penicillins Dermatitis, Itching and Rash       ============================================  MEDICAL HISTORY  Past Medical History:   Diagnosis Date     Varicose veins of lower extremity      No past surgical history on file.    OB History    Para Term  AB Living   1 0 0 0 0 0   SAB IAB Ectopic Multiple Live Births   0 0 0 0 0      # Outcome Date GA Lbr Joss/2nd Weight Sex Delivery Anes PTL Lv   1 Current                  GYN History- Denies Abnormal Pap Smears                        Cervical procedures: none                        History of STI: no    I personally reviewed the past social/family/medical and surgical history on the date of service.   I reviewed lab work done at Intake visit with patient.    EXAM:  /79   Pulse 67   Ht 1.575 m (5' 2.01\")   Wt 65.8 kg (145 lb)   LMP 2022 (Exact Date)   BMI 26.51 kg/m     EXAM:  GENERAL:  Pleasant pregnant female, alert, cooperative and well groomed.  SKIN:  Warm and dry, without lesions or rashes  HEAD: Symmetrical features.  MOUTH:  Buccal mucosa pink, moist without lesions.  Teeth in good repair.    NECK:  Thyroid without enlargement and nodules.  Lymph nodes not palpable.   LUNGS:  Clear to auscultation.  BREAST:    No dominant, fixed or suspicious masses are noted.  No skin or nipple changes or axillary nodes.   Nipples flat.      HEART:  RRR " without murmur.  ABDOMEN: Soft without masses , tenderness or organomegaly.  No CVA tenderness.  Uterus palpable at size equal to dates.  No scars noted.. Fetal heart tones present.  MUSCULOSKELETAL:  Full range of motion  EXTREMITIES:  No edema. No significant varicosities.   PELVIC EXAM: deferred, patient reports pap smear in [place] one year ago.    GC/CHLAMYDIA CULTURE OBTAINED:at previous visit.  Confirmed that sample was not a clean catch        ASSESSMENT:  32 year old , 14w5d weeks of pregnancy with MAGGIE of 2023 by LMP  Intrauterine pregnancy 14w5d size consistent with dates  Genetic Screening: completed, level II US scheduled    ICD-10-CM    1. Normal pregnancy in first trimester  Z34.91 CANCELED: Chlamydia trachomatis PCR (Clinic Collect)     CANCELED: Neisseria gonorrhoeae PCR   2. Supervision of normal first pregnancy, antepartum  Z34.00        PLAN:  -Plan UC at next visit, discussed with patient.    - Reviewed use of triage nurse line and contacting the on-call provider after hours for an urgent need such as fever, vagina bleeding, bladder or vaginal infection, rupture of membranes,  or term labor.    - Reviewed best evidence for: weight gain for her weight and height for pregnancy:  Based on pre-pregnancy Body mass index is 26.51 kg/m . RECOMMENDED WEIGHT GAIN: 15-25 lbs.  - Reviewed healthy diet and foods to avoid, exercise and activity during pregnancy; avoiding exposure to toxoplasmosis; and maintenance of a generally healthy lifestyle.   - Discussed the harms, benefits, side effects and alternative therapies for current prescribed and OTC medications.  - All pt's and partner's questions discussed and answered.  Pt verbalized understanding of and agreement to plan of care.     - Continue scheduled prenatal care and prn if questions or concerns    FIDENCIO Nieto CNM            Again, thank you for allowing me to participate in the care of your patient.       Sincerely,    FIDENCIO NietoM

## 2022-08-19 NOTE — LETTER
Date:August 20, 2022      Provider requested that no letter be sent. Do not send.       [facility]

## 2022-08-19 NOTE — PROGRESS NOTES
SUBJECTIVE:   Elham is a 32 year old female who presents to clinic for a new OB visit.   at 14w5d with Estimated Date of Delivery: 2023 based on LMP. Feels well. Has started PNV.     She has not had bleeding since her LMP.   She has had mild nausea. Weight loss has occurred, for a total of 3 pounds.   This was a planned pregnancy.   Partner is involved,  Jaxson, accompanies her to visit today   OTHER CONCERNS: none today    ===========================================   ROS: 10 point ROS neg other than the symptoms noted above in the HPI.      PSYCHIATRIC:  Denies mood changes.        Past History:  Her past medical history   Past Medical History:   Diagnosis Date     Varicose veins of lower extremity    .   This is her first pregnancy  Since her last LMP she denies use of alcohol, tobacco and street drugs.  HISTORY:  Family History   Problem Relation Age of Onset     Anxiety Disorder Mother      Prostate Cancer Maternal Grandfather      Other Cancer Maternal Grandfather      Hypertension Maternal Grandmother      Depression Maternal Grandmother      Diabetes Paternal Grandfather      Hypertension Paternal Grandmother      Social History     Socioeconomic History     Marital status:      Spouse name: None     Number of children: None     Years of education: None     Highest education level: None   Tobacco Use     Smoking status: Never Smoker     Smokeless tobacco: Never Used   Substance and Sexual Activity     Alcohol use: Not Currently     Drug use: Never     Sexual activity: Yes     Partners: Male     Birth control/protection: None     Current Outpatient Medications   Medication Sig     doxylamine (UNISOM SLEEPTABS) 25 MG TABS tablet Take 0.5 tablets (12.5 mg) by mouth At Bedtime     Prenatal Vit-Fe Fumarate-FA (PRENATAL MULTIVITAMIN W/IRON) 27-0.8 MG tablet Take 1 tablet by mouth daily     pyridOXINE (VITAMIN B6) 25 MG tablet Take 1 tablet (25 mg) by mouth daily     calcium carbonate (TUMS)  "500 MG chewable tablet Take 1 tablet (500 mg) by mouth 2 times daily (Patient not taking: Reported on 2022)     No current facility-administered medications for this visit.     Allergies   Allergen Reactions     Misc. Sulfonamide Containing Compounds Dermatitis and Rash     Penicillins Dermatitis, Itching and Rash       ============================================  MEDICAL HISTORY  Past Medical History:   Diagnosis Date     Varicose veins of lower extremity      No past surgical history on file.    OB History    Para Term  AB Living   1 0 0 0 0 0   SAB IAB Ectopic Multiple Live Births   0 0 0 0 0      # Outcome Date GA Lbr Joss/2nd Weight Sex Delivery Anes PTL Lv   1 Current                  GYN History- Denies Abnormal Pap Smears                        Cervical procedures: none                        History of STI: no    I personally reviewed the past social/family/medical and surgical history on the date of service.   I reviewed lab work done at Intake visit with patient.    EXAM:  /79   Pulse 67   Ht 1.575 m (5' 2.01\")   Wt 65.8 kg (145 lb)   LMP 2022 (Exact Date)   BMI 26.51 kg/m     EXAM:  GENERAL:  Pleasant pregnant female, alert, cooperative and well groomed.  SKIN:  Warm and dry, without lesions or rashes  HEAD: Symmetrical features.  MOUTH:  Buccal mucosa pink, moist without lesions.  Teeth in good repair.    NECK:  Thyroid without enlargement and nodules.  Lymph nodes not palpable.   LUNGS:  Clear to auscultation.  BREAST:    No dominant, fixed or suspicious masses are noted.  No skin or nipple changes or axillary nodes.   Nipples flat.      HEART:  RRR without murmur.  ABDOMEN: Soft without masses , tenderness or organomegaly.  No CVA tenderness.  Uterus palpable at size equal to dates.  No scars noted.. Fetal heart tones present.  MUSCULOSKELETAL:  Full range of motion  EXTREMITIES:  No edema. No significant varicosities.   PELVIC EXAM: deferred, patient reports " pap smear in [place] one year ago.    GC/CHLAMYDIA CULTURE OBTAINED:at previous visit.  Confirmed that sample was not a clean catch        ASSESSMENT:  32 year old , 14w5d weeks of pregnancy with MAGGIE of 2023 by LMP  Intrauterine pregnancy 14w5d size consistent with dates  Genetic Screening: completed, level II US scheduled    ICD-10-CM    1. Normal pregnancy in first trimester  Z34.91 CANCELED: Chlamydia trachomatis PCR (Clinic Collect)     CANCELED: Neisseria gonorrhoeae PCR   2. Supervision of normal first pregnancy, antepartum  Z34.00        PLAN:  -Plan UC at next visit, discussed with patient.    - Reviewed use of triage nurse line and contacting the on-call provider after hours for an urgent need such as fever, vagina bleeding, bladder or vaginal infection, rupture of membranes,  or term labor.    - Reviewed best evidence for: weight gain for her weight and height for pregnancy:  Based on pre-pregnancy Body mass index is 26.51 kg/m . RECOMMENDED WEIGHT GAIN: 15-25 lbs.  - Reviewed healthy diet and foods to avoid, exercise and activity during pregnancy; avoiding exposure to toxoplasmosis; and maintenance of a generally healthy lifestyle.   - Discussed the harms, benefits, side effects and alternative therapies for current prescribed and OTC medications.  - All pt's and partner's questions discussed and answered.  Pt verbalized understanding of and agreement to plan of care.     - Continue scheduled prenatal care and prn if questions or concerns    FIDENCIO Nieto CNM

## 2022-09-16 ENCOUNTER — OFFICE VISIT (OUTPATIENT)
Dept: OBGYN | Facility: CLINIC | Age: 32
End: 2022-09-16
Attending: ADVANCED PRACTICE MIDWIFE
Payer: COMMERCIAL

## 2022-09-16 ENCOUNTER — OFFICE VISIT (OUTPATIENT)
Dept: MATERNAL FETAL MEDICINE | Facility: CLINIC | Age: 32
End: 2022-09-16
Attending: OBSTETRICS & GYNECOLOGY
Payer: COMMERCIAL

## 2022-09-16 ENCOUNTER — HOSPITAL ENCOUNTER (OUTPATIENT)
Dept: ULTRASOUND IMAGING | Facility: CLINIC | Age: 32
Discharge: HOME | End: 2022-09-16
Attending: OBSTETRICS & GYNECOLOGY
Payer: COMMERCIAL

## 2022-09-16 VITALS
SYSTOLIC BLOOD PRESSURE: 108 MMHG | HEART RATE: 68 BPM | DIASTOLIC BLOOD PRESSURE: 76 MMHG | BODY MASS INDEX: 26.5 KG/M2 | WEIGHT: 144 LBS | HEIGHT: 62 IN

## 2022-09-16 DIAGNOSIS — Z34.02 ENCOUNTER FOR SUPERVISION OF NORMAL FIRST PREGNANCY IN SECOND TRIMESTER: Primary | ICD-10-CM

## 2022-09-16 DIAGNOSIS — E55.9 VITAMIN D DEFICIENCY: ICD-10-CM

## 2022-09-16 DIAGNOSIS — O26.90 PREGNANCY RELATED CONDITION, ANTEPARTUM: ICD-10-CM

## 2022-09-16 DIAGNOSIS — O36.5920 POOR FETAL GROWTH AFFECTING MANAGEMENT OF MOTHER IN SECOND TRIMESTER, SINGLE OR UNSPECIFIED FETUS: Primary | ICD-10-CM

## 2022-09-16 PROCEDURE — G0008 ADMIN INFLUENZA VIRUS VAC: HCPCS

## 2022-09-16 PROCEDURE — 99207 PR PRENATAL VISIT: CPT | Performed by: ADVANCED PRACTICE MIDWIFE

## 2022-09-16 PROCEDURE — 87086 URINE CULTURE/COLONY COUNT: CPT | Performed by: ADVANCED PRACTICE MIDWIFE

## 2022-09-16 PROCEDURE — 250N000011 HC RX IP 250 OP 636

## 2022-09-16 PROCEDURE — G0463 HOSPITAL OUTPT CLINIC VISIT: HCPCS

## 2022-09-16 PROCEDURE — 76811 OB US DETAILED SNGL FETUS: CPT

## 2022-09-16 PROCEDURE — 90686 IIV4 VACC NO PRSV 0.5 ML IM: CPT

## 2022-09-16 PROCEDURE — 76811 OB US DETAILED SNGL FETUS: CPT | Mod: 26 | Performed by: OBSTETRICS & GYNECOLOGY

## 2022-09-16 NOTE — PROGRESS NOTES
"Subjective:      32 year old  at 18w5d presents for a routine prenatal appointment.         Denies cramping/contractions, vaginal bleeding, discharge or leakage of fluid. Reports +fetal movement.  No HA, vision changes, ruq/epigastric pain.        Patient concerns: Feeling well overall. Notes appetite is much better. 1st trimester ultrasound and NIPT wnl. Placenta anterior. Having a girl!  Has level 2 ultrasound today.     Going to [place] in 1 month for 3 weeks.     Objective:  Vitals:    22 0832   BP: 108/76   BP Location: Left arm   Patient Position: Sitting   Cuff Size: Adult Regular   Pulse: 68   Weight: 65.3 kg (144 lb)   Height: 1.575 m (5' 2\")       See OB flowsheet    Assessment/Plan     Encounter Diagnoses   Name Primary?     Encounter for supervision of normal first pregnancy in second trimester Yes     Vitamin D deficiency      Orders Placed This Encounter   Procedures     INFLUENZA VACCINE IM >6 MO VALENT IIV4 (AFLURIA/FLUZONE)     - Reviewed total weight gain, encouraged continued healthy diet and exercise.      - Reviewed why/how to contact provider.      Patient education/orders or handouts today:  PTL signs/symptoms, fetal movement and level 2 u/s scheduled    - Continue vitamin D supplement.     - Urine culture collected.  - Will need hepatitis B surface antibody with EOB labs.     - Level 2 ultrasound today.    Continue scheduled prenatal care, RTC in 4 weeks and prn if questions or concerns.      Antolin Ngo, FIDENCIO, CNM   "

## 2022-09-16 NOTE — PROGRESS NOTES
Please see full imaging report from [employer] program under imaging tab.    MAGGIE changed to 2/16/23, based on 10 week US, after new disclosure today of very irregular menses.     Charlie Wolf MD  Maternal Fetal Medicine

## 2022-09-16 NOTE — LETTER
Date:September 21, 2022      Provider requested that no letter be sent. Do not send.       [facility]

## 2022-09-16 NOTE — LETTER
"2022       RE: Elham Hansen  [address]  [facility] [address]     Dear Colleague,    Thank you for referring your patient, Elham Hansen, to the [facility]OM[facility] [place] at [facility]. Please see a copy of my visit note below.    Subjective:      32 year old  at 18w5d presents for a routine prenatal appointment.         Denies cramping/contractions, vaginal bleeding, discharge or leakage of fluid. Reports +fetal movement.  No HA, vision changes, ruq/epigastric pain.        Patient concerns: Feeling well overall. Notes appetite is much better. 1st trimester ultrasound and NIPT wnl. Placenta anterior. Having a girl!  Has level 2 ultrasound today.     Going to [place] in 1 month for 3 weeks.     Objective:  Vitals:    22 0832   BP: 108/76   BP Location: Left arm   Patient Position: Sitting   Cuff Size: Adult Regular   Pulse: 68   Weight: 65.3 kg (144 lb)   Height: 1.575 m (5' 2\")       See OB flowsheet    Assessment/Plan     Encounter Diagnoses   Name Primary?     Encounter for supervision of normal first pregnancy in second trimester Yes     Vitamin D deficiency      Orders Placed This Encounter   Procedures     INFLUENZA VACCINE IM >6 MO VALENT IIV4 (AFLURIA/FLUZONE)     - Reviewed total weight gain, encouraged continued healthy diet and exercise.      - Reviewed why/how to contact provider.      Patient education/orders or handouts today:  PTL signs/symptoms, fetal movement and level 2 u/s scheduled    - Continue vitamin D supplement.     - Urine culture collected.  - Will need hepatitis B surface antibody with EOB labs.     - Level 2 ultrasound today.    Continue scheduled prenatal care, RTC in 4 weeks and prn if questions or concerns.      FIDENCIO Rios CNM       Again, thank you for allowing me to participate in the care of your patient.      Sincerely,    Antolin Ngo CNM      "

## 2022-09-18 LAB — BACTERIA UR CULT: NORMAL

## 2022-09-20 PROBLEM — E55.9 VITAMIN D DEFICIENCY: Status: ACTIVE | Noted: 2022-09-20

## 2022-10-07 ENCOUNTER — HOSPITAL ENCOUNTER (OUTPATIENT)
Dept: ULTRASOUND IMAGING | Facility: CLINIC | Age: 32
Discharge: HOME | End: 2022-10-07
Attending: OBSTETRICS & GYNECOLOGY
Payer: COMMERCIAL

## 2022-10-07 ENCOUNTER — OFFICE VISIT (OUTPATIENT)
Dept: MATERNAL FETAL MEDICINE | Facility: CLINIC | Age: 32
End: 2022-10-07
Attending: OBSTETRICS & GYNECOLOGY
Payer: COMMERCIAL

## 2022-10-07 DIAGNOSIS — Z03.73 SUSPECTED FETAL ANOMALY NOT FOUND: ICD-10-CM

## 2022-10-07 DIAGNOSIS — N83.209 OVARIAN CYST DURING PREGNANCY IN SECOND TRIMESTER: Primary | ICD-10-CM

## 2022-10-07 DIAGNOSIS — O34.82 OVARIAN CYST DURING PREGNANCY IN SECOND TRIMESTER: Primary | ICD-10-CM

## 2022-10-07 DIAGNOSIS — O36.5920 POOR FETAL GROWTH AFFECTING MANAGEMENT OF MOTHER IN SECOND TRIMESTER, SINGLE OR UNSPECIFIED FETUS: ICD-10-CM

## 2022-10-07 PROCEDURE — 76816 OB US FOLLOW-UP PER FETUS: CPT

## 2022-10-07 PROCEDURE — 76816 OB US FOLLOW-UP PER FETUS: CPT | Mod: 26 | Performed by: OBSTETRICS & GYNECOLOGY

## 2022-10-07 NOTE — PROGRESS NOTES
"Please see \"Imaging\" tab under \"Chart Review\" for details of today's visit.    Jose Cruz Moctezuma    "

## 2022-10-10 ENCOUNTER — OFFICE VISIT (OUTPATIENT)
Dept: OBGYN | Facility: CLINIC | Age: 32
End: 2022-10-10
Attending: MIDWIFE
Payer: COMMERCIAL

## 2022-10-10 VITALS
BODY MASS INDEX: 25.53 KG/M2 | HEART RATE: 101 BPM | SYSTOLIC BLOOD PRESSURE: 125 MMHG | HEIGHT: 63 IN | DIASTOLIC BLOOD PRESSURE: 87 MMHG | WEIGHT: 144.1 LBS

## 2022-10-10 DIAGNOSIS — Z34.02 ENCOUNTER FOR SUPERVISION OF NORMAL FIRST PREGNANCY IN SECOND TRIMESTER: Primary | ICD-10-CM

## 2022-10-10 DIAGNOSIS — E55.9 VITAMIN D DEFICIENCY: ICD-10-CM

## 2022-10-10 DIAGNOSIS — D36.9 DERMOID CYST: ICD-10-CM

## 2022-10-10 PROCEDURE — G0463 HOSPITAL OUTPT CLINIC VISIT: HCPCS

## 2022-10-10 PROCEDURE — 99207 PR PRENATAL VISIT: CPT | Performed by: MIDWIFE

## 2022-10-10 NOTE — LETTER
"10/10/2022       RE: Elham Hansen  [address]  [facility] [address]     Dear Colleague,    Thank you for referring your patient, Elham Hansen, to the [facility] [place] at [facility]. Please see a copy of my visit note below.    Subjective:      32 year old  at 21w4d presents for a routine prenatal appointment.       No vaginal bleeding or leakage of fluid.  No contractions or cramping. So much fetal movement!  No HA, visual changes, RUQ or epigastric pain.     The patient presents with the following concerns:   - Going to [place] next week.   - Reviewed intake/NOB labs - all WNL except vit D slightly low  - Level II US - WNL. Dermoid cyst- evaluate during c/s if needing one or PP followup if      Objective:  Vitals:    10/10/22 1035   BP: 125/87   BP Location: Left arm   Patient Position: Sitting   Cuff Size: Adult Regular   Pulse: 101   Weight: 65.4 kg (144 lb 1.6 oz)   Height: 1.6 m (5' 3\")     See OB flowsheet    Assessment/Plan     Encounter Diagnoses   Name Primary?     Encounter for supervision of normal first pregnancy in second trimester Yes     Dermoid cyst      Vitamin D deficiency      No orders of the defined types were placed in this encounter.    No orders of the defined types were placed in this encounter.    - Reviewed total weight gain, encouraged continued healthy diet and exercise.      - Reviewed why/how to contact provider.      Patient education/orders or handouts today:  PTL signs/symptoms and fetal movement     Next visit in 4 weeks  Call prn if questions or concerns.     FIDENCIO Shannon CNM                  Again, thank you for allowing me to participate in the care of your patient.      Sincerely,    FIDENCIO Shannon CNM      "

## 2022-10-10 NOTE — PROGRESS NOTES
"Subjective:      32 year old  at 21w4d presents for a routine prenatal appointment.       No vaginal bleeding or leakage of fluid.  No contractions or cramping. So much fetal movement!  No HA, visual changes, RUQ or epigastric pain.     The patient presents with the following concerns:   - Going to [place] next week.   - Reviewed intake/NOB labs - all WNL except vit D slightly low  - Level II US - WNL. Dermoid cyst- evaluate during c/s if needing one or PP followup if      Objective:  Vitals:    10/10/22 1035   BP: 125/87   BP Location: Left arm   Patient Position: Sitting   Cuff Size: Adult Regular   Pulse: 101   Weight: 65.4 kg (144 lb 1.6 oz)   Height: 1.6 m (5' 3\")     See OB flowsheet    Assessment/Plan     Encounter Diagnoses   Name Primary?     Encounter for supervision of normal first pregnancy in second trimester Yes     Dermoid cyst      Vitamin D deficiency      No orders of the defined types were placed in this encounter.    No orders of the defined types were placed in this encounter.    - Reviewed total weight gain, encouraged continued healthy diet and exercise.      - Reviewed why/how to contact provider.      Patient education/orders or handouts today:  PTL signs/symptoms and fetal movement     Next visit in 4 weeks  Call prn if questions or concerns.     FIDENCIO Shannon CNM              "

## 2022-10-10 NOTE — LETTER
Date:October 11, 2022      Provider requested that no letter be sent. Do not send.       [facility]

## 2022-11-18 ENCOUNTER — LAB (OUTPATIENT)
Dept: LAB | Facility: CLINIC | Age: 32
End: 2022-11-18
Attending: ADVANCED PRACTICE MIDWIFE
Payer: COMMERCIAL

## 2022-11-18 ENCOUNTER — OFFICE VISIT (OUTPATIENT)
Dept: OBGYN | Facility: CLINIC | Age: 32
End: 2022-11-18
Attending: ADVANCED PRACTICE MIDWIFE
Payer: COMMERCIAL

## 2022-11-18 VITALS
DIASTOLIC BLOOD PRESSURE: 78 MMHG | HEART RATE: 60 BPM | BODY MASS INDEX: 26.75 KG/M2 | SYSTOLIC BLOOD PRESSURE: 117 MMHG | HEIGHT: 63 IN | WEIGHT: 151 LBS

## 2022-11-18 DIAGNOSIS — Z34.02 ENCOUNTER FOR SUPERVISION OF NORMAL FIRST PREGNANCY IN SECOND TRIMESTER: Primary | ICD-10-CM

## 2022-11-18 DIAGNOSIS — E55.9 VITAMIN D DEFICIENCY: ICD-10-CM

## 2022-11-18 DIAGNOSIS — Z34.02 ENCOUNTER FOR SUPERVISION OF NORMAL FIRST PREGNANCY IN SECOND TRIMESTER: ICD-10-CM

## 2022-11-18 PROBLEM — R73.09 ABNORMAL GLUCOSE: Status: ACTIVE | Noted: 2022-11-18

## 2022-11-18 LAB
DEPRECATED CALCIDIOL+CALCIFEROL SERPL-MC: 39 UG/L (ref 20–75)
ERYTHROCYTE [DISTWIDTH] IN BLOOD BY AUTOMATED COUNT: 12.6 % (ref 10–15)
GLUCOSE 1H P 50 G GLC PO SERPL-MCNC: 188 MG/DL (ref 70–129)
HBV SURFACE AB SERPL IA-ACNC: 526.27 M[IU]/ML
HBV SURFACE AB SERPL IA-ACNC: REACTIVE M[IU]/ML
HCT VFR BLD AUTO: 34.3 % (ref 35–47)
HGB BLD-MCNC: 12.1 G/DL (ref 11.7–15.7)
MCH RBC QN AUTO: 30.4 PG (ref 26.5–33)
MCHC RBC AUTO-ENTMCNC: 35.3 G/DL (ref 31.5–36.5)
MCV RBC AUTO: 86 FL (ref 78–100)
PLATELET # BLD AUTO: 317 10E3/UL (ref 150–450)
RBC # BLD AUTO: 3.98 10E6/UL (ref 3.8–5.2)
T PALLIDUM AB SER QL: NONREACTIVE
WBC # BLD AUTO: 8.4 10E3/UL (ref 4–11)

## 2022-11-18 PROCEDURE — 99207 PR PRENATAL VISIT: CPT | Performed by: ADVANCED PRACTICE MIDWIFE

## 2022-11-18 PROCEDURE — 86706 HEP B SURFACE ANTIBODY: CPT

## 2022-11-18 PROCEDURE — 85027 COMPLETE CBC AUTOMATED: CPT

## 2022-11-18 PROCEDURE — 82950 GLUCOSE TEST: CPT

## 2022-11-18 PROCEDURE — 82306 VITAMIN D 25 HYDROXY: CPT

## 2022-11-18 PROCEDURE — 36415 COLL VENOUS BLD VENIPUNCTURE: CPT

## 2022-11-18 PROCEDURE — 86780 TREPONEMA PALLIDUM: CPT

## 2022-11-18 PROCEDURE — G0463 HOSPITAL OUTPT CLINIC VISIT: HCPCS

## 2022-11-18 ASSESSMENT — PATIENT HEALTH QUESTIONNAIRE - PHQ9
SUM OF ALL RESPONSES TO PHQ QUESTIONS 1-9: 5
5. POOR APPETITE OR OVEREATING: SEVERAL DAYS

## 2022-11-18 ASSESSMENT — ANXIETY QUESTIONNAIRES
3. WORRYING TOO MUCH ABOUT DIFFERENT THINGS: NOT AT ALL
5. BEING SO RESTLESS THAT IT IS HARD TO SIT STILL: NOT AT ALL
GAD7 TOTAL SCORE: 2
7. FEELING AFRAID AS IF SOMETHING AWFUL MIGHT HAPPEN: NOT AT ALL
6. BECOMING EASILY ANNOYED OR IRRITABLE: NOT AT ALL
GAD7 TOTAL SCORE: 2
2. NOT BEING ABLE TO STOP OR CONTROL WORRYING: NOT AT ALL
1. FEELING NERVOUS, ANXIOUS, OR ON EDGE: SEVERAL DAYS

## 2022-11-18 NOTE — LETTER
"2022       RE: Elham Hansen  [address]  [facility] [address]     Dear Colleague,    Thank you for referring your patient, Elham Hansen, to the [facility] [place] at [facility]. Please see a copy of my visit note below.    Subjective:      32 year old  at 27w1d presents for a routine prenatal appointment.      Denies vaginal bleeding or leakage of fluid. No contractions or cramping.    Good fetal movement.       No HA, visual changes, RUQ or epigastric pain.   The patient presents with the following concerns: No concerns today       Objective:  Vitals:    22 0937   BP: 117/78   Pulse: 60   Weight: 68.5 kg (151 lb)   Height: 1.6 m (5' 2.99\")     See OB flowsheet    Assessment/Plan         Encounter Diagnoses   Name Primary?     Encounter for supervision of normal first pregnancy in second trimester Yes     Vitamin D deficiency      Orders Placed This Encounter   Procedures     Glucose 1 Hour     CBC with Platelets     Treponema Abs w Reflex to RPR and Titer     Hepatitis B Surface Antibody     25- OH-Vitamin D         - Reviewed total weight gain, encouraged continued healthy diet and exercise.      - Reviewed why/how to contact provider.    Patient education/orders or handouts today:  - fetal movement and Plan for EOB visit    - GCT & third trimester labs today  - Reviewed TDAP- patient would like to receive at her next appointment   Return to clinic in 2 weeks for EOB and prn if questions or concerns.       I, Blanca Koroma, am serving as a scribe; to document services personally performed by  Marija Morocho CNM based on data collection and the provider's statements to me.     Blanca Koroma BSN, RN, NP Student    I agree with the PFSH and ROS as completed by Blanca Koroma DNP student except for changes made by me. The remainder of the encounter was performed by me and scribed by Blanca Koroma DNP " student. The scribed note accurately reflects my personal services and decisions made by me.   FIDENCIO Nieto CNM                                Again, thank you for allowing me to participate in the care of your patient.      Sincerely,    FIDENCIO Nieto CNM

## 2022-11-18 NOTE — LETTER
Date:November 21, 2022      Provider requested that no letter be sent. Do not send.       [facility]

## 2022-11-18 NOTE — PROGRESS NOTES
"Subjective:      32 year old  at 27w1d presents for a routine prenatal appointment.      Denies vaginal bleeding or leakage of fluid. No contractions or cramping.    Good fetal movement.       No HA, visual changes, RUQ or epigastric pain.   The patient presents with the following concerns: No concerns today       Objective:  Vitals:    22 0937   BP: 117/78   Pulse: 60   Weight: 68.5 kg (151 lb)   Height: 1.6 m (5' 2.99\")     See OB flowsheet    Assessment/Plan         Encounter Diagnoses   Name Primary?     Encounter for supervision of normal first pregnancy in second trimester Yes     Vitamin D deficiency      Orders Placed This Encounter   Procedures     Glucose 1 Hour     CBC with Platelets     Treponema Abs w Reflex to RPR and Titer     Hepatitis B Surface Antibody     25- OH-Vitamin D         - Reviewed total weight gain, encouraged continued healthy diet and exercise.      - Reviewed why/how to contact provider.    Patient education/orders or handouts today:  - fetal movement and Plan for EOB visit    - GCT & third trimester labs today  - Reviewed TDAP- patient would like to receive at her next appointment   Return to clinic in 2 weeks for EOB and prn if questions or concerns.       I, Blanca Koroma, am serving as a scribe; to document services personally performed by  Marija Morocho CNM based on data collection and the provider's statements to me.     Blanca AGUILARN, RN, WHNP Student    I agree with the PFSH and ROS as completed by Blanca Koroma, AISHA student except for changes made by me. The remainder of the encounter was performed by me and scribed by Blanca Koroma, AISHA student. The scribed note accurately reflects my personal services and decisions made by me.   FIDENCIO Nieto CNM                            "

## 2022-11-21 ENCOUNTER — TELEPHONE (OUTPATIENT)
Dept: OBGYN | Facility: CLINIC | Age: 32
End: 2022-11-21

## 2022-11-21 DIAGNOSIS — R73.09 IMPAIRED GLUCOSE TOLERANCE TEST: Primary | ICD-10-CM

## 2022-11-21 NOTE — TELEPHONE ENCOUNTER
Called and spoke with the patient via phone with the help of a  to go over her failed 1 hour GTT.     I let her know that she needs to come in to our outpatient lab fasting for at least 8 hours and then she will have her fasting blood drawn, she will then drink the glucose drink, and then have her blood drawn every hour for 3 hours.     Instructed her to bring a snack that contains a protein and a carb to help stabilize her blood sugar when she is done. To bring something to do as well. She can drink water while fasting as well.    I did place the order and will send the patient a Slinky message with instructions that I went over as well. The patient stated that she can read English.

## 2022-11-22 ENCOUNTER — LAB (OUTPATIENT)
Dept: LAB | Facility: CLINIC | Age: 32
End: 2022-11-22
Payer: COMMERCIAL

## 2022-11-22 DIAGNOSIS — R73.09 IMPAIRED GLUCOSE TOLERANCE TEST: ICD-10-CM

## 2022-11-22 DIAGNOSIS — O24.419 GESTATIONAL DIABETES MELLITUS (GDM) IN THIRD TRIMESTER, GESTATIONAL DIABETES METHOD OF CONTROL UNSPECIFIED: Primary | ICD-10-CM

## 2022-11-22 LAB
GESTATIONAL GTT 1 HR POST DOSE: 183 MG/DL (ref 60–179)
GESTATIONAL GTT 2 HR POST DOSE: 123 MG/DL (ref 60–154)
GESTATIONAL GTT 3 HR POST DOSE: 146 MG/DL (ref 60–139)
GLUCOSE P FAST SERPL-MCNC: 100 MG/DL (ref 60–94)

## 2022-11-22 PROCEDURE — 82947 ASSAY GLUCOSE BLOOD QUANT: CPT

## 2022-11-22 PROCEDURE — 82950 GLUCOSE TEST: CPT

## 2022-11-22 PROCEDURE — 36415 COLL VENOUS BLD VENIPUNCTURE: CPT

## 2022-11-22 PROCEDURE — 82951 GLUCOSE TOLERANCE TEST (GTT): CPT

## 2022-11-22 NOTE — RESULT ENCOUNTER NOTE
Called patient with help of . Informed of GDM diagnosis, instructed to  supplies at [facility], and that diabetes educator will be reaching out to her.  Patient plans to  supplies tomorrow.

## 2022-12-01 ENCOUNTER — VIRTUAL VISIT (OUTPATIENT)
Dept: EDUCATION SERVICES | Facility: CLINIC | Age: 32
End: 2022-12-01
Attending: MIDWIFE
Payer: COMMERCIAL

## 2022-12-01 DIAGNOSIS — O24.419 GESTATIONAL DIABETES MELLITUS: Primary | ICD-10-CM

## 2022-12-01 PROCEDURE — 97802 MEDICAL NUTRITION INDIV IN: CPT | Performed by: DIETITIAN, REGISTERED

## 2022-12-01 NOTE — LETTER
12/1/2022         RE: Elham Hansen  [address]  [facility] [address]        Dear Colleague,    Thank you for referring your patient, Elham Hansen, to the [facility]. Please see a copy of my visit note below.    Diabetes Self-Management Education & Support      SUBJECTIVE/OBJECTIVE:  Presents for education related to gestational diabetes.    Accompanied by: Self  Gestational weeks: 29w0d  Number of previous pregnancies: 0  Had any babies over 9 lbs: No  Previously had Gestational Diabetes: No  Have you ever had thyroid problems or taken thyroid medication?: No  Heart disease, mitral valve prolapse or rheumatic fever?: No  Hypertension : No  High Cholesterol: No  High Triglycerides: No  Do you use tobacco products?: No  Do you drink beer, wine or hard liquor?: No    Cultural Influences/Ethnic Background:   or     Estimated Date of Delivery: Feb 16, 2023    1 hour OGTT  Lab Results   Component Value Date    GLU1 188 (H) 11/18/2022         3 hour OGTT    Fasting  Lab Results   Component Value Date    GTTGF 100 (H) 11/22/2022       1 hour  Lab Results   Component Value Date    GTTG1 183 (H) 11/22/2022       2 hour  Lab Results   Component Value Date    GTTG2 123 11/22/2022       3 hour  Lab Results   Component Value Date    GTTG3 146 (H) 11/22/2022       Lifestyle and Health Behaviors:  Pre-pregnancy weight (lbs): 148  Exercise:: Yes  On average, minutes per day of exercise at this level: 20  How intense was your typical exercise? : Light (like stretching or slow walking) (pilates/walking)  Meals include: Breakfast, Lunch, Dinner, Morning Snack, Afternoon Snack  Breakfast: flour tortilla + eggs + coffee  Lunch: crepes with meat + salad  Dinner: half hot dog OR pasta wtih chicken salad OR shrimp cream + salad/chicken  Snacks: AM-popcorn OR yogurt with strawberry OR toast with peanut butter PM-6 ritz crackers OR fried potatoes HS-no  Other: *last meal/snack @  8  Beverages: Water (prior to DM drank soda, no longer soda)  Pre- vitamin?: Yes    Healthy Coping:  Emotional response to diabetes: Ready to learn  Stage of change: ACTION (Actively working towards change)    Current Management:  Difficulty affording diabetes medication?: No    ASSESSMENT:  Elham has a new GDM diagnosis. She did well with education and verbalized understanding of all topics covered today. She has already been working on dietary changes and has started using the glucometer. She has f/u next week.       INTERVENTION:  Reviewed target blood glucose values, sharps disposal, diagnosis criteria for GDM and importance of good blood glucose management for health of mom and baby. Patient advised to call if 3 blood glucose elevated before returns next week. Instructed on ketone checking and told to call if unable to get ketones negative.       Discussed carbohydrate sources and impact on blood glucose. Reviewed basics of healthy eating and incorporating a variety of foods into meal plan. Instructed on carbohydrate counting and label reading and recommended patient consume 30-45g cho for breakfast, 45-60g cho for lunch and dinner and 15-30g cho for each snacks, also adding protein at each of these. Suggested plate method to balance meals.      Discussed importance of not going too low in carbohydrates since that may cause liver to produce excess glucose and contribute to elevated blood glucose readings and ketone formation. Encouraged eating breakfast within 1 hour of waking.  To also help prevent against ketone formation, protein was encouraged with meals and snacks, especially with the night snack. Reviewed benefits of exercising to help lower blood glucose and walking after meals, as tolerated and per MD approval, if seeing elevated blood glucose after a meal. Pt verbalized understanding of concepts discussed and recommendations provided.      Educational topics covered today:  GDM diagnosis,  pathophysiology, Risks and Complications of GDM, Means of controlling GDM, Using a Blood Glucose Monitor, Blood Glucose Goals, Logging and Interpreting Glucose Results, Ketone Testing, When to Call a Diabetes Educator or OB Provider, Healthy Eating During Pregnancy, Counting Carbohydrates, Meal Planning for GDM, and Physical Activity    Educational materials provided today: (she received these)  Danish Magana Gestational Diabetes  GDM Log Book  Sharps Disposal  Care After Delivery      Pt verbalized understanding of concepts discussed and recommendations provided today.     PLAN:  1. Check glucose 4 times daily, before breakfast daily and 1 hour after each meal, or as recommended.  Blood glucose goal before breakfast: <95 mg/dL  1 hour after start of meals:  <140 mg/dL OR  2 hours after start of meal: <120mg/dL (can do this if you forget 1 hour check)    2. Check ketones daily or once a week after they have been negative for 7 days in a row. If ketones are elevated, let your diabetes educator know and continue to check daily until they are negative for 7 days in a row.    3. Continue with recommended physical activity.    4. Continue to follow recommended meal plan: 30-45g carbs at breakfast, 45-60g carbs at lunch, 45-60g carbs at supper, 15-30g carbs at snacks.  Follow consistent CHO meal plan, eat CHO and protein/fat at all meals/snacks.    5. Follow-up with OB doctor as recommended.    6. Call or MyChart message your diabetes educator if 3 or more blood sugars are above the goal in 1 week or if ketones are elevated (small or larger).     Lucille Mayes RD, ANTOINE, [facility]      Time Spent: 45+ minutes  Encounter Type: Individual    Any diabetes medication dose changes were made via the CDE Protocol and Collaborative Practice Agreement with the patient's OB/GYN provider. A copy of this encounter was shared with the provider.

## 2022-12-01 NOTE — PATIENT INSTRUCTIONS
"Your 1 week follow-up Diabetes Education visit is scheduled next week    1. Check glucose 4 times daily, before breakfast daily and 1 hour after each meal, or as recommended.  Blood glucose goal before breakfast: <95 mg/dL  1 hour after start of meals:  <140 mg/dL OR  2 hours after start of meal: <120mg/dL (can do this if you forget 1 hour check)     -Lancets should be placed in a sharps container or you can use a laundry container, do not throw lancets in the trash.  If using laundry container, once mostly full, can duct-tape the lid closed, label \"Sharps-do not recycle\" and then place in trash. You can call your [employer] service to find appropriate drop off sites for lancets.    2. Check ketones daily or once a week after they have been negative for 7 days in a row. If ketones are elevated, let your diabetes educator know and continue to check daily until they are negative for 7 days in a row.    3. Continue with recommended physical activity.    4. Continue to follow recommended meal plan: 30-45g carbs at breakfast, 45-60g carbs at lunch, 45-60g carbs at supper, 15-30g carbs at snacks.  Follow consistent CHO meal plan, eat CHO and protein/fat at all meals/snacks.    5. Follow-up with OB doctor as recommended.    6. Call or MyChart message your diabetes educator if 3 or more blood sugars are above the goal in 1 week or if ketones are elevated (small or larger).       [facility] for the [facility] Area:  For Your Diabetes Education or Nutrition Appointments Call:  [phone]   For Diabetes Education and Nutrition Related Questions:   Phone: [phone]  Send MyChart Message   If you need a medication refill please contact your pharmacy. Please allow 3 business days for your refills to be completed.     "

## 2022-12-01 NOTE — PROGRESS NOTES
Diabetes Self-Management Education & Support      SUBJECTIVE/OBJECTIVE:  Presents for education related to gestational diabetes.    Accompanied by: Self  Gestational weeks: 29w0d  Number of previous pregnancies: 0  Had any babies over 9 lbs: No  Previously had Gestational Diabetes: No  Have you ever had thyroid problems or taken thyroid medication?: No  Heart disease, mitral valve prolapse or rheumatic fever?: No  Hypertension : No  High Cholesterol: No  High Triglycerides: No  Do you use tobacco products?: No  Do you drink beer, wine or hard liquor?: No    Cultural Influences/Ethnic Background:   or     Estimated Date of Delivery: 2023    1 hour OGTT  Lab Results   Component Value Date    GLU1 188 (H) 2022         3 hour OGTT    Fasting  Lab Results   Component Value Date    GTTGF 100 (H) 2022       1 hour  Lab Results   Component Value Date    GTTG1 183 (H) 2022       2 hour  Lab Results   Component Value Date    GTTG2 123 2022       3 hour  Lab Results   Component Value Date    GTTG3 146 (H) 2022       Lifestyle and Health Behaviors:  Pre-pregnancy weight (lbs): 148  Exercise:: Yes  On average, minutes per day of exercise at this level: 20  How intense was your typical exercise? : Light (like stretching or slow walking) (pilates/walking)  Meals include: Breakfast, Lunch, Dinner, Morning Snack, Afternoon Snack  Breakfast: flour tortilla + eggs + coffee  Lunch: crepes with meat + salad  Dinner: half hot dog OR pasta wtih chicken salad OR shrimp cream + salad/chicken  Snacks: AM-popcorn OR yogurt with strawberry OR toast with peanut butter PM-6 ritz crackers OR fried potatoes HS-no  Other: *last meal/snack @ 8  Beverages: Water (prior to DM drank soda, no longer soda)  Pre- vitamin?: Yes    Healthy Coping:  Emotional response to diabetes: Ready to learn  Stage of change: ACTION (Actively working towards change)    Current Management:  Difficulty affording  diabetes medication?: No    ASSESSMENT:  Elham has a new GDM diagnosis. She did well with education and verbalized understanding of all topics covered today. She has already been working on dietary changes and has started using the glucometer. She has f/u next week.       INTERVENTION:  Reviewed target blood glucose values, sharps disposal, diagnosis criteria for GDM and importance of good blood glucose management for health of mom and baby. Patient advised to call if 3 blood glucose elevated before returns next week. Instructed on ketone checking and told to call if unable to get ketones negative.       Discussed carbohydrate sources and impact on blood glucose. Reviewed basics of healthy eating and incorporating a variety of foods into meal plan. Instructed on carbohydrate counting and label reading and recommended patient consume 30-45g cho for breakfast, 45-60g cho for lunch and dinner and 15-30g cho for each snacks, also adding protein at each of these. Suggested plate method to balance meals.      Discussed importance of not going too low in carbohydrates since that may cause liver to produce excess glucose and contribute to elevated blood glucose readings and ketone formation. Encouraged eating breakfast within 1 hour of waking.  To also help prevent against ketone formation, protein was encouraged with meals and snacks, especially with the night snack. Reviewed benefits of exercising to help lower blood glucose and walking after meals, as tolerated and per MD approval, if seeing elevated blood glucose after a meal. Pt verbalized understanding of concepts discussed and recommendations provided.      Educational topics covered today:  GDM diagnosis, pathophysiology, Risks and Complications of GDM, Means of controlling GDM, Using a Blood Glucose Monitor, Blood Glucose Goals, Logging and Interpreting Glucose Results, Ketone Testing, When to Call a Diabetes Educator or OB Provider, Healthy Eating During  Pregnancy, Counting Carbohydrates, Meal Planning for GDM, and Physical Activity    Educational materials provided today: (she received these)  Danish Understanding Gestational Diabetes  GDM Log Book  Sharps Disposal  Care After Delivery      Pt verbalized understanding of concepts discussed and recommendations provided today.     PLAN:  1. Check glucose 4 times daily, before breakfast daily and 1 hour after each meal, or as recommended.  Blood glucose goal before breakfast: <95 mg/dL  1 hour after start of meals:  <140 mg/dL OR  2 hours after start of meal: <120mg/dL (can do this if you forget 1 hour check)    2. Check ketones daily or once a week after they have been negative for 7 days in a row. If ketones are elevated, let your diabetes educator know and continue to check daily until they are negative for 7 days in a row.    3. Continue with recommended physical activity.    4. Continue to follow recommended meal plan: 30-45g carbs at breakfast, 45-60g carbs at lunch, 45-60g carbs at supper, 15-30g carbs at snacks.  Follow consistent CHO meal plan, eat CHO and protein/fat at all meals/snacks.    5. Follow-up with OB doctor as recommended.    6. Call or MyChart message your diabetes educator if 3 or more blood sugars are above the goal in 1 week or if ketones are elevated (small or larger).     Lucille Mayes RD, ANTOINE, [facility]      Time Spent: 45+ minutes  Encounter Type: Individual    Any diabetes medication dose changes were made via the CDE Protocol and Collaborative Practice Agreement with the patient's OB/GYN provider. A copy of this encounter was shared with the provider.

## 2022-12-08 ENCOUNTER — VIRTUAL VISIT (OUTPATIENT)
Dept: EDUCATION SERVICES | Facility: CLINIC | Age: 32
End: 2022-12-08
Payer: COMMERCIAL

## 2022-12-08 ENCOUNTER — TELEPHONE (OUTPATIENT)
Dept: EDUCATION SERVICES | Facility: CLINIC | Age: 32
End: 2022-12-08

## 2022-12-08 DIAGNOSIS — O24.410 DIET CONTROLLED GESTATIONAL DIABETES MELLITUS (GDM) IN THIRD TRIMESTER: Primary | ICD-10-CM

## 2022-12-08 PROCEDURE — G0108 DIAB MANAGE TRN  PER INDIV: HCPCS | Mod: TEL

## 2022-12-08 NOTE — LETTER
12/8/2022         RE: Elham Hansen  [address]  [facility] [address]        Dear Colleague,    Thank you for referring your patient, Elham Hansen, to the [facility]. Please see a copy of my visit note below.    Diabetes Self-Management Education & Support  Type of Service: Telephone Visit/ 34minutes     Originating Location (Patient Location): Home  Distant Location (Provider Location): Home - [facility]  Mode of Communication:  Telephone     Telephone Visit Start Time: 9:04 AM  Telephone Visit End Time (telephone visit stop time): 9:38 AM  Patient declined need for an .      How would patient like to obtain AVS? MyChart    SUBJECTIVE/OBJECTIVE:  Presents for education related to gestational diabetes.    Accompanied by: Self  Gestational weeks: 30w  [facility]ospital [facility] delivery: [facility] OB Visit Date: 12/09/22  Number of previous pregnancies: 0  Had any babies over 9 lbs: No  Previously had Gestational Diabetes: No  Have you ever had thyroid problems or taken thyroid medication?: No  Heart disease, mitral valve prolapse or rheumatic fever?: No  Hypertension : No  High Cholesterol: No  High Triglycerides: No  Do you use tobacco products?: No  Do you drink beer, wine or hard liquor?: No    Cultural Influences/Ethnic Background:   or       LMP 05/08/2022 (Exact Date)     Weight gain : not assessed today  Estimated Date of Delivery: Feb 16, 2023    Blood Glucose/Ketone Log:   Date Ketones Fasting Post Breakfast Post Lunch Post Supper   12/1  100 128 109 97   12/2  98 169 140    12/3  97 135  131   12/4  83 109 113 103   12/5  92 172 114 106   12/6 trace 102 168 97 144   12/7 trace 95 156 122 118   12/8 trace 99      (testing 1 hour from the start of each meal)    Lifestyle and Health Behaviors:  Pre-pregnancy weight (lbs): 148  Exercise:: Yes  Days per week of moderate to strenuous exercise (like a brisk walk): 5  On average, minutes  per day of exercise at this level: 20  How intense was your typical exercise? : Light (like stretching or slow walking) (pilates/walking)  Exercise Minutes per Week: 100  Meal planning/habits: Carb counting  How many times a week on average do you eat food made away from home (restaurant/take-out)?: 1  Meals include: Breakfast, Lunch, Dinner, Morning Snack, Afternoon Snack, Evening Snack  Breakfast: eggs with vegetables OR  scrambled eggs with hashbrowns and pancake OR sandwich  Lunch: chicken salad OR salad OR rice with chicken/meat and vegetables  Dinner: last night: lentils and meat empanada  Snacks: AM-popcorn OR yogurt with berries OR Ukrainian toast with peanut butter OR Ritz Crackers with cream cheese; PM-chips OR grapes with peanut butter OR sugar free Jello OR Ukrainian toast with berries or peanut; HS-sometimes - Ritz crackers  Beverages: Water, Tea (unsweetened ice tea, sometimes Stevia in tea)  Pre- vitamin?: Yes  Supplements?: Yes  List supplements currently taking: Vitamin D  Experiencing nausea?: No  Experiencing heartburn?: No    Healthy Coping:  Emotional response to diabetes: Ready to learn  Stage of change: ACTION (Actively working towards change)    Current Management:  Taking medications for gestational diabetes?: No  Difficulty affording diabetes medication?: No    ASSESSMENT:  Ketones: trace x3.   Fasting blood glucoses: 38% in target.  After breakfast: 43% in target.  After lunch: 100% in target.  After dinner: 83% in target.    Anticipate patient will need to begin NPH at HS to improve fasting and post-breakfast glucose control.  Consistently having a HS snack may improve fasting glucoses. Majority of visit today spent discussing the recommended meal plan.  Patient agreed to an HS snack of 1 cup of milk today. Reviewed reading food labels for total carbohydrates and patient practiced with foods in her home.   Additionally instructed patient to test fasting glucose when she wakes up for the  day rather than waiting until she begins to prepare her breakfast meal.     Chart routed to referring provider, Dr. Royal, Dr. Fierro, and Alison Brooks, pharmacist as an update.  Separate phone encounter sent to them as well alerting to patient's likely need for insulin.     Deferred discussion of the following topics to a future visit due to insufficient time today: What to expect after delivery, Future testing for Type 2 diabetes (2 hour OGTT at 6 week post-partum check-up and annual fasting blood glucose level), Risk of GDM and planning ahead for future pregnancies, Recommended lifestyle interventions for reducing the risk of Type 2 Diabetes, When to Call a Diabetes Educator or OB Provider    INTERVENTION:  Educational topics covered today:  Recommended meal plan, glucose monitoring, how to read a food label for total carbohydrates, urine ketone testing    Educational Materials provided today:  No new educational materials provided today.     PLAN:  1. Test glucose 4 times per day:   Fasting (when you first awake for the day): 95 mg/dL or below   1 hour after breakfast: 140 mg/dL or below   1 hour after lunch: 140 mg/dL or below   1 hour after dinner: 140 mg/dL or below     Please bring your meter and log book to all appointments     If you miss 1 hour after meal test, test 2 hours after the meal.  Goal 2 hours after is 120 mg/dL or below.     2.  Check your urine ketones once a day, when you first awake for the day, until they are negative for 7 days in a row.  Then test once a week. Goal is negative to trace.    3.  Meal Plan    Breakfast (9 AM): 30 grams carbohydrate + protein   Snack (12 PM): 15-30 grams carbohydrate + protein  Lunch (3 PM): 45-60 grams carbohydrate + protein  Snack (6 PM): 15-30 grams carbohydrate + protein  Dinner (8:30-9 PM): 45-60 grams carbohydrate + protein  Snack (10:30-11 PM): 15-30 grams carbohydrate + protein    A few tips:   -consume some carbohydrate every 2-3 hours while  awake   -you need a minimum of 175 grams of carbohydrate per day   -fruit and cold breakfast cereal are best tolerated at lunch or later   -protein includes: cheese, eggs, fish, nuts, nut butter, chicken, turkey, beef, and pork   -snack ideas: an individual container of Greek yogurt (try Chobani Less Sugar), whole grain crackers and cheese, chocolate fairlife milk, a Kashi or KIND bar, a baseball size piece of whole fruit + nut butter (apple + peanut butter), fruit canned in it's own juice + cottage cheese    4.  Aim for 20-30 minutes of activity most days of the week.      5.  Call OB team or send a [employer] message with:   -questions or concerns   -ketones that are small, moderate, or large   -3 or more blood sugars above target in a 7 day period    Parvin Chin, MPH, RD, CDCES, LD 12/8/2022    Time Spent: 34 minutes  Encounter Type: Individual    Any diabetes medication dose changes were made via the CDE Protocol and Collaborative Practice Agreement with the patient's referring provider. A copy of this encounter was shared with the provider.

## 2022-12-08 NOTE — PATIENT INSTRUCTIONS
Test glucose 4 times per day:   Fasting (when you first awake for the day): 95 mg/dL or below   1 hour after breakfast: 140 mg/dL or below   1 hour after lunch: 140 mg/dL or below   1 hour after dinner: 140 mg/dL or below     Please bring your meter and log book to all appointments     If you miss 1 hour after meal test, test 2 hours after the meal.  Goal 2 hours after is 120 mg/dL or below.     2.  Check your urine ketones once a day, when you first awake for the day, until they are negative for 7 days in a row.  Then test once a week. Goal is negative to trace.    3.  Meal Plan    Breakfast (9 AM): 30 grams carbohydrate + protein   Snack (12 PM): 15-30 grams carbohydrate + protein  Lunch (3 PM): 45-60 grams carbohydrate + protein  Snack (6 PM): 15-30 grams carbohydrate + protein  Dinner (8:30-9 PM): 45-60 grams carbohydrate + protein  Snack (10:30-11 PM): 15-30 grams carbohydrate + protein    A few tips:   -consume some carbohydrate every 2-3 hours while awake   -you need a minimum of 175 grams of carbohydrate per day   -fruit and cold breakfast cereal are best tolerated at lunch or later   -protein includes: cheese, eggs, fish, nuts, nut butter, chicken, turkey, beef, and pork   -snack ideas: an individual container of Greek yogurt (try Chobani Less Sugar), whole grain crackers and cheese, chocolate fairlife milk, a Kashi or KIND bar, a baseball size piece of whole fruit + nut butter (apple + peanut butter), fruit canned in it's own juice + cottage cheese    4.  Aim for 20-30 minutes of activity most days of the week.      5.  Call OB team or send a [employer] message with:   -questions or concerns   -ketones that are small, moderate, or large   -3 or more blood sugars above target in a 7 day period    Thank you,     Parvin Chin, MPH, RD, CDCES, LD 12/8/2022

## 2022-12-08 NOTE — TELEPHONE ENCOUNTER
Please see virtual visit encounter dated 12/8/22.  Patient with pattern of elevated fasting and post-breakfast glucoses.  She has appointment with FIDENCIO Medrano CNM tomorrow, 12/9/22.  I did not have sufficient time to teach insulin during visit today.  Please let me know if any questions/concerns.   Parvin Chin, MPH, RD, CDCES, LD 12/8/2022

## 2022-12-08 NOTE — PROGRESS NOTES
Diabetes Self-Management Education & Support  Type of Service: Telephone Visit/ 34minutes     Originating Location (Patient Location): Home  Distant Location (Provider Location): [facility]  Mode of Communication:  Telephone     Telephone Visit Start Time: 9:04 AM  Telephone Visit End Time (telephone visit stop time): 9:38 AM  Patient declined need for an .      How would patient like to obtain AVS? Timmyhart    SUBJECTIVE/OBJECTIVE:  Presents for education related to gestational diabetes.    Accompanied by: Self  Gestational weeks: 30w  Hospital planned for delivery: [facility]  Next OB Visit Date: 12/09/22  Number of previous pregnancies: 0  Had any babies over 9 lbs: No  Previously had Gestational Diabetes: No  Have you ever had thyroid problems or taken thyroid medication?: No  Heart disease, mitral valve prolapse or rheumatic fever?: No  Hypertension : No  High Cholesterol: No  High Triglycerides: No  Do you use tobacco products?: No  Do you drink beer, wine or hard liquor?: No    Cultural Influences/Ethnic Background:   or       LMP 05/08/2022 (Exact Date)     Weight gain : not assessed today  Estimated Date of Delivery: Feb 16, 2023    Blood Glucose/Ketone Log:   Date Ketones Fasting Post Breakfast Post Lunch Post Supper   12/1  100 128 109 97   12/2  98 169 140    12/3  97 135  131   12/4  83 109 113 103   12/5  92 172 114 106   12/6 trace 102 168 97 144   12/7 trace 95 156 122 118   12/8 trace 99      (testing 1 hour from the start of each meal)    Lifestyle and Health Behaviors:  Pre-pregnancy weight (lbs): 148  Exercise:: Yes  Days per week of moderate to strenuous exercise (like a brisk walk): 5  On average, minutes per day of exercise at this level: 20  How intense was your typical exercise? : Light (like stretching or slow walking) (pilates/walking)  Exercise Minutes per Week: 100  Meal planning/habits: Carb counting  How many times a week on average do you eat  food made away from home (restaurant/take-out)?: 1  Meals include: Breakfast, Lunch, Dinner, Morning Snack, Afternoon Snack, Evening Snack  Breakfast: eggs with vegetables OR  scrambled eggs with hashbrowns and pancake OR sandwich  Lunch: chicken salad OR salad OR rice with chicken/meat and vegetables  Dinner: last night: lentils and meat empanada  Snacks: AM-popcorn OR yogurt with berries OR Tunisian toast with peanut butter OR Ritz Crackers with cream cheese; PM-chips OR grapes with peanut butter OR sugar free Jello OR Tunisian toast with berries or peanut; HS-sometimes - Ritz crackers  Beverages: Water, Tea (unsweetened ice tea, sometimes Stevia in tea)  Pre-marie vitamin?: Yes  Supplements?: Yes  List supplements currently taking: Vitamin D  Experiencing nausea?: No  Experiencing heartburn?: No    Healthy Coping:  Emotional response to diabetes: Ready to learn  Stage of change: ACTION (Actively working towards change)    Current Management:  Taking medications for gestational diabetes?: No  Difficulty affording diabetes medication?: No    ASSESSMENT:  Ketones: trace x3.   Fasting blood glucoses: 38% in target.  After breakfast: 43% in target.  After lunch: 100% in target.  After dinner: 83% in target.    Anticipate patient will need to begin NPH at HS to improve fasting and post-breakfast glucose control.  Consistently having a HS snack may improve fasting glucoses. Majority of visit today spent discussing the recommended meal plan.  Patient agreed to an HS snack of 1 cup of milk today. Reviewed reading food labels for total carbohydrates and patient practiced with foods in her home.   Additionally instructed patient to test fasting glucose when she wakes up for the day rather than waiting until she begins to prepare her breakfast meal.     Chart routed to referring provider, Dr. Royal, Dr. Fierro, and Alison Brooks, pharmacist as an update.  Separate phone encounter sent to them as well alerting to patient's  likely need for insulin.     Deferred discussion of the following topics to a future visit due to insufficient time today: What to expect after delivery, Future testing for Type 2 diabetes (2 hour OGTT at 6 week post-partum check-up and annual fasting blood glucose level), Risk of GDM and planning ahead for future pregnancies, Recommended lifestyle interventions for reducing the risk of Type 2 Diabetes, When to Call a Diabetes Educator or OB Provider    INTERVENTION:  Educational topics covered today:  Recommended meal plan, glucose monitoring, how to read a food label for total carbohydrates, urine ketone testing    Educational Materials provided today:  No new educational materials provided today.     PLAN:  1. Test glucose 4 times per day:   Fasting (when you first awake for the day): 95 mg/dL or below   1 hour after breakfast: 140 mg/dL or below   1 hour after lunch: 140 mg/dL or below   1 hour after dinner: 140 mg/dL or below     Please bring your meter and log book to all appointments     If you miss 1 hour after meal test, test 2 hours after the meal.  Goal 2 hours after is 120 mg/dL or below.     2.  Check your urine ketones once a day, when you first awake for the day, until they are negative for 7 days in a row.  Then test once a week. Goal is negative to trace.    3.  Meal Plan    Breakfast (9 AM): 30 grams carbohydrate + protein   Snack (12 PM): 15-30 grams carbohydrate + protein  Lunch (3 PM): 45-60 grams carbohydrate + protein  Snack (6 PM): 15-30 grams carbohydrate + protein  Dinner (8:30-9 PM): 45-60 grams carbohydrate + protein  Snack (10:30-11 PM): 15-30 grams carbohydrate + protein    A few tips:   -consume some carbohydrate every 2-3 hours while awake   -you need a minimum of 175 grams of carbohydrate per day   -fruit and cold breakfast cereal are best tolerated at lunch or later   -protein includes: cheese, eggs, fish, nuts, nut butter, chicken, turkey, beef, and pork   -snack ideas: an  individual container of Greek yogurt (try Chobani Less Sugar), whole grain crackers and cheese, chocolate fairlife milk, a Kashi or KIND bar, a baseball size piece of whole fruit + nut butter (apple + peanut butter), fruit canned in it's own juice + cottage cheese    4.  Aim for 20-30 minutes of activity most days of the week.      5.  Call OB team or send a [employer] message with:   -questions or concerns   -ketones that are small, moderate, or large   -3 or more blood sugars above target in a 7 day period    Parvin Chin, MPH, RD, CDCES, LD 12/8/2022    Time Spent: 34 minutes  Encounter Type: Individual    Any diabetes medication dose changes were made via the CDE Protocol and Collaborative Practice Agreement with the patient's referring provider. A copy of this encounter was shared with the provider.

## 2022-12-09 ENCOUNTER — VIRTUAL VISIT (OUTPATIENT)
Dept: FAMILY MEDICINE | Facility: CLINIC | Age: 32
End: 2022-12-09
Attending: FAMILY MEDICINE
Payer: COMMERCIAL

## 2022-12-09 ENCOUNTER — OFFICE VISIT (OUTPATIENT)
Dept: OBGYN | Facility: CLINIC | Age: 32
End: 2022-12-09
Attending: REGISTERED NURSE
Payer: COMMERCIAL

## 2022-12-09 VITALS
SYSTOLIC BLOOD PRESSURE: 113 MMHG | HEIGHT: 63 IN | HEART RATE: 73 BPM | WEIGHT: 152 LBS | BODY MASS INDEX: 26.93 KG/M2 | DIASTOLIC BLOOD PRESSURE: 80 MMHG

## 2022-12-09 DIAGNOSIS — D36.9 DERMOID CYST: ICD-10-CM

## 2022-12-09 DIAGNOSIS — O24.414 INSULIN CONTROLLED GESTATIONAL DIABETES MELLITUS (GDM) IN THIRD TRIMESTER: ICD-10-CM

## 2022-12-09 DIAGNOSIS — E55.9 VITAMIN D DEFICIENCY: ICD-10-CM

## 2022-12-09 DIAGNOSIS — Z34.02 ENCOUNTER FOR SUPERVISION OF NORMAL FIRST PREGNANCY IN SECOND TRIMESTER: Primary | ICD-10-CM

## 2022-12-09 DIAGNOSIS — O24.414 INSULIN CONTROLLED GESTATIONAL DIABETES MELLITUS (GDM) IN THIRD TRIMESTER: Primary | ICD-10-CM

## 2022-12-09 PROBLEM — R73.09 ABNORMAL GLUCOSE: Status: RESOLVED | Noted: 2022-11-18 | Resolved: 2022-12-09

## 2022-12-09 PROCEDURE — 99207 PR PRENATAL VISIT: CPT | Performed by: REGISTERED NURSE

## 2022-12-09 PROCEDURE — 250N000011 HC RX IP 250 OP 636

## 2022-12-09 PROCEDURE — 90715 TDAP VACCINE 7 YRS/> IM: CPT

## 2022-12-09 PROCEDURE — G0463 HOSPITAL OUTPT CLINIC VISIT: HCPCS

## 2022-12-09 PROCEDURE — 90471 IMMUNIZATION ADMIN: CPT

## 2022-12-09 PROCEDURE — 99203 OFFICE O/P NEW LOW 30 MIN: CPT | Mod: TEL | Performed by: FAMILY MEDICINE

## 2022-12-09 ASSESSMENT — PATIENT HEALTH QUESTIONNAIRE - PHQ9: SUM OF ALL RESPONSES TO PHQ QUESTIONS 1-9: 1

## 2022-12-09 NOTE — LETTER
Date:December 9, 2022      Provider requested that no letter be sent. Do not send.       [facility]

## 2022-12-09 NOTE — PROGRESS NOTES
[facility]  Turkmen phone  utilized    HPI:  Elham Hansen is a 32 year old  at 30w1d with a new diagnosis of gestational diabetes.     She has seen diabetes education.   She does not have a history of gestational diabetes.   She does not have a history of Type 2 Diabetes.   She does  have a family history of Type 2 Diabetes.   She does not have a history of pre-term labor.   She does not have a history of chronic hypertension, gestational hypertension or pre-eclampsia.   She does not have a history of LGA (infant >10 lbs).     She is checking her blood sugars regularly, including Fasting and 1-hour post-prandial.    Blood Glucose/Ketone Log:   Date Ketones Fasting Post Breakfast Post Lunch Post Supper      100 128 109 97   /   98 169 140     12/3   97 135   131      83 109 113 103      92 172 114 106    trace 102 168 97 144    trace 95 156 122 118    trace 99             Diabetes Symptoms:   none      Physical Exam:   LMP 2022 (Exact Date)   No acute distress.     Assessment:   Elham Hansen is a 32 year old  at 30w1d with a new diagnosis of gestational diabetes. After reviewing her blood sugars, less than 50% are at target. Based on this, I recommend Continued dietary and life-style modifications, Initiation of insulin therapy.     Plan:   Continue dietary modifications, regular exercise as able  Continue to check blood glucose 4x daily  Bring glucose log to all appointments  Needs 2-hr GTT at 6 wks post-partum  For fasting blood glucoses >95, add long-acting insulin at 0.1-0.2 units/kg, 10 units. I discussed with the patient the risks, benefits, and alternatives to taking this medication as well as common side effects. Reviewed s/sx of hypoglycemia and treatment.   Follow-up with Dr. Fierro or Dr. Royal in 1 week(s)    Phone call duration: 16 minutes    Ella Fierro MD

## 2022-12-09 NOTE — LETTER
2022       RE: Elham Hansen  [address]  [place] [address]     Dear Colleague,    Thank you for referring your patient, Elham Hansen, to the [facility] [place] [facility]. Please see a copy of my visit note below.    S: 32 year old, , 30w1d, here for EOB visit.     Feeling normal fetal movement, denies contractions, denies vaginal bleeding/LOF. Some increased pelvic pressure today.     The patient presents with the following concerns:   - Feeling well overall, would like to discuss gestational diabetes and planning for labor and delivery today.     GDM - Manages with diet currently. Seen by educator yesterday with the following assessment:    Ketones: trace x3.   Fasting blood glucoses: 38% in target.  After breakfast: 43% in target.  After lunch: 100% in target.  After dinner: 83% in target.    GDM control: Plan for follow-up with Dr. Royal/Dr. Fierro to discuss insulin after visit today.     PHQ-9 SCORE 2022   PHQ-9 Total Score 5 1     Education completed today includes breast feeding, [facility] hand out, contraception, counting movements, signs of pre-term labor, when to present to birthplace, post partum depression, GBS, getting enough iron, labor induction, nitrous oxide, doulas, vitamin K and hypertension disorders.  Birth preferences reviewed: Un-Medicated  Labor support:      Feeding plans :    Contraception planned:  condoms  Water birth consent form was not given.    TDAP Was given.    A/P:  Encounter Diagnoses   Name Primary?     Encounter for supervision of normal first pregnancy in second trimester Yes     Insulin controlled gestational diabetes mellitus (GDM) in third trimester      Orders Placed This Encounter   Procedures     US OB Fetal Biophys Prf wo NonStrs Singls Sgl     US OB Fetal Biophys Prf wo NonStrs Singls Sgl     US OB Fetal Biophys Prf  wo NonStrs Singls Sgl     US OB Fetal Biophys Prf wo NonStrs Singls Sgl     US OB Fetal Biophys Prf wo NonStrs Singls Sgl     US OB Fetal Biophys Prf wo NonStrs Singls Sgl     US OB Follow Up >14 Weeks     US OB Fetal Biophys Prf wo NonStrs Singls Sgl     US OB Fetal Biophys Prf wo NonStrs Singls Sgl     TDAP VACCINE (Adacel, Boostrix)  [[id number]]     No orders of the defined types were placed in this encounter.    - Serial Growth US and x2 weekly BPPs ordered to start at 32 weeks for presumed GDMA2, will plan for telephone consult with Dr. Fierro this afternoon to discuss need for starting insulin.   - Briefly reviewed recommendations for IOL depending on GDM control, review again at next visit.   - TDAP vaccine given today    Continue scheduled prenatal care  FIDENCIO Steel CNM                    Again, thank you for allowing me to participate in the care of your patient.      Sincerely,    FIDENCIO Steel CNM

## 2022-12-09 NOTE — PROGRESS NOTES
S: 32 year old, , 30w1d, here for EOB visit.     Feeling normal fetal movement, denies contractions, denies vaginal bleeding/LOF. Some increased pelvic pressure today.     The patient presents with the following concerns:   - Feeling well overall, would like to discuss gestational diabetes and planning for labor and delivery today.     GDM - Manages with diet currently. Seen by educator yesterday with the following assessment:    Ketones: trace x3.   Fasting blood glucoses: 38% in target.  After breakfast: 43% in target.  After lunch: 100% in target.  After dinner: 83% in target.    GDM control: Plan for follow-up with Dr. Royal/Dr. Fierro to discuss insulin after visit today.     PHQ-9 SCORE 2022   PHQ-9 Total Score 5 1     Education completed today includes breast feeding, [facility] hand out, contraception, counting movements, signs of pre-term labor, when to present to birthplace, post partum depression, GBS, getting enough iron, labor induction, nitrous oxide, doulas, vitamin K and hypertension disorders.  Birth preferences reviewed: Un-Medicated  Labor support:      Feeding plans :    Contraception planned:  condoms  Water birth consent form was not given.    TDAP Was given.    A/P:  Encounter Diagnoses   Name Primary?     Encounter for supervision of normal first pregnancy in second trimester Yes     Insulin controlled gestational diabetes mellitus (GDM) in third trimester      Orders Placed This Encounter   Procedures     US OB Fetal Biophys Prf wo NonStrs Singls Sgl     US OB Fetal Biophys Prf wo NonStrs Singls Sgl     US OB Fetal Biophys Prf wo NonStrs Singls Sgl     US OB Fetal Biophys Prf wo NonStrs Singls Sgl     US OB Fetal Biophys Prf wo NonStrs Singls Sgl     US OB Fetal Biophys Prf wo NonStrs Singls Sgl     US OB Follow Up >14 Weeks     US OB Fetal Biophys Prf wo NonStrs Singls Sgl     US OB Fetal Biophys Prf wo NonStrs Singls Sgl     TDAP  VACCINE (Adacel, Boostrix)  [[id number]]     No orders of the defined types were placed in this encounter.    - Serial Growth US and x2 weekly BPPs ordered to start at 32 weeks for presumed GDMA2, will plan for telephone consult with Dr. Fierro this afternoon to discuss need for starting insulin.   - Briefly reviewed recommendations for IOL depending on GDM control, review again at next visit.   - TDAP vaccine given today    Continue scheduled prenatal care  FIDENCIO Steel CNM

## 2022-12-13 ENCOUNTER — TELEPHONE (OUTPATIENT)
Dept: FAMILY MEDICINE | Facility: CLINIC | Age: 32
End: 2022-12-13

## 2022-12-13 NOTE — TELEPHONE ENCOUNTER
Please reach out to Elham to confirm she was able to  and start insulin. Please have her make an appt with me on Friday to review blood sugar readings. Thank you!

## 2022-12-13 NOTE — TELEPHONE ENCOUNTER
Spoke with patient with .  Patient picked up insulin and has been using it 3 days. Scheduled 1:40pm phone visit with Dr. Fierro for Friday.

## 2022-12-16 ENCOUNTER — ANCILLARY PROCEDURE (OUTPATIENT)
Dept: ULTRASOUND IMAGING | Facility: CLINIC | Age: 32
End: 2022-12-16
Attending: REGISTERED NURSE
Payer: COMMERCIAL

## 2022-12-16 ENCOUNTER — VIRTUAL VISIT (OUTPATIENT)
Dept: FAMILY MEDICINE | Facility: CLINIC | Age: 32
End: 2022-12-16
Attending: FAMILY MEDICINE
Payer: COMMERCIAL

## 2022-12-16 DIAGNOSIS — O24.414 INSULIN CONTROLLED GESTATIONAL DIABETES MELLITUS (GDM) IN THIRD TRIMESTER: ICD-10-CM

## 2022-12-16 DIAGNOSIS — Z91.199 NO-SHOW FOR APPOINTMENT: Primary | ICD-10-CM

## 2022-12-16 DIAGNOSIS — Z34.02 ENCOUNTER FOR SUPERVISION OF NORMAL FIRST PREGNANCY IN SECOND TRIMESTER: ICD-10-CM

## 2022-12-16 PROCEDURE — 76816 OB US FOLLOW-UP PER FETUS: CPT | Mod: 26 | Performed by: OBSTETRICS & GYNECOLOGY

## 2022-12-16 PROCEDURE — 76819 FETAL BIOPHYS PROFIL W/O NST: CPT | Mod: 26 | Performed by: OBSTETRICS & GYNECOLOGY

## 2022-12-16 PROCEDURE — 76816 OB US FOLLOW-UP PER FETUS: CPT

## 2022-12-16 NOTE — PROGRESS NOTES
This patient was a no show for this scheduled appointment. Called twice and left a voicemail with assistance of .

## 2022-12-16 NOTE — Clinical Note
Patient did not answer phone call for phone visit for GDM follow-up. Can we attempt to call her and obtain blood glucose readings for further insulin dose adjustment?

## 2022-12-19 NOTE — TELEPHONE ENCOUNTER
Patient did not answer phone for her GDM follow-up visit last week. Please call her and obtain blood glucose readings and make an appointment for further GDM management.

## 2022-12-20 NOTE — TELEPHONE ENCOUNTER
Called pt with assistance from . Pt reports not receiving a call or message from the clinic. Pt reports blood sugars less than 90 before breakfast, and the following checks less than 140 after meals. Transferred to  for appointment scheduling and router to Dr. Fierro.

## 2022-12-21 ENCOUNTER — ANCILLARY PROCEDURE (OUTPATIENT)
Dept: ULTRASOUND IMAGING | Facility: CLINIC | Age: 32
End: 2022-12-21
Attending: REGISTERED NURSE
Payer: COMMERCIAL

## 2022-12-21 DIAGNOSIS — O24.414 INSULIN CONTROLLED GESTATIONAL DIABETES MELLITUS (GDM) IN THIRD TRIMESTER: ICD-10-CM

## 2022-12-21 DIAGNOSIS — Z34.02 ENCOUNTER FOR SUPERVISION OF NORMAL FIRST PREGNANCY IN SECOND TRIMESTER: ICD-10-CM

## 2022-12-21 PROCEDURE — 76819 FETAL BIOPHYS PROFIL W/O NST: CPT

## 2022-12-21 PROCEDURE — 76819 FETAL BIOPHYS PROFIL W/O NST: CPT | Mod: 26 | Performed by: OBSTETRICS & GYNECOLOGY

## 2022-12-22 NOTE — PROGRESS NOTES
"  [facility]    HPI:  Elham Hansen is a 32 year old  at 32w1d who comes in for a follow-up of gestational diabetes. She is taking insulin detemir 10 units at bedtime. No side effects. No hypoglycemia.     She is checking her blood sugars regularly, including Fasting and 1-hour post-prandial. She says post-prandial numbers are great but fasting is high sometimes. She is having a bedtime snack. She is trying to walk or do pilates regularly. Also taking the stairs in her apartment building.   Feeling good fetal movement, has midwife appt right after this visit.     Blood Glucose Log:   Date Fasting Post Breakfast Post Lunch Post Supper    105 123 missed 132    103 117 missed 103    97 97 missed 111    61 144 missed 150    99 133 111 139    85 138 93 139    99 103 99 113    92          Diabetes Symptoms:   None      Physical Exam:   /74   Pulse 75   Ht 1.6 m (5' 2.99\")   Wt 69.9 kg (154 lb)   LMP 2022 (Exact Date)   BMI 27.29 kg/m    No acute distress.     Assessment:   Elham Hansen is a 32 year old  at 32w1d with a diagnosis of gestational diabetes. After reviewing her blood sugars, less than 50% of fasting blood sugars are at target. Based on this, I recommend Continued dietary and life-style modifications, increase of insulin therapy.     Plan:   Continue dietary modifications, regular exercise as able  Continue to check blood glucose 4x daily  Bring glucose log to all appointments  Needs 2-hr GTT at 6 wks post-partum  Insulin increase from 10 units at bedtime to 12 units at bedtime. I discussed with the patient the risks, benefits, and alternatives to taking this medication as well as common side effects. Reviewed s/sx of hypoglycemia and treatment.   Follow-up with Dr. Fierro, Dr. Brooks, or Dr. Royal in 2 week(s)      Ella Fierro MD  "

## 2022-12-23 ENCOUNTER — OFFICE VISIT (OUTPATIENT)
Dept: FAMILY MEDICINE | Facility: CLINIC | Age: 32
End: 2022-12-23
Attending: FAMILY MEDICINE
Payer: COMMERCIAL

## 2022-12-23 ENCOUNTER — OFFICE VISIT (OUTPATIENT)
Dept: OBGYN | Facility: CLINIC | Age: 32
End: 2022-12-23
Attending: ADVANCED PRACTICE MIDWIFE
Payer: COMMERCIAL

## 2022-12-23 VITALS
RESPIRATION RATE: 18 BRPM | SYSTOLIC BLOOD PRESSURE: 106 MMHG | HEIGHT: 63 IN | WEIGHT: 154 LBS | HEART RATE: 75 BPM | DIASTOLIC BLOOD PRESSURE: 74 MMHG | BODY MASS INDEX: 27.29 KG/M2

## 2022-12-23 VITALS
WEIGHT: 154 LBS | HEIGHT: 63 IN | HEART RATE: 75 BPM | DIASTOLIC BLOOD PRESSURE: 74 MMHG | BODY MASS INDEX: 27.29 KG/M2 | SYSTOLIC BLOOD PRESSURE: 106 MMHG

## 2022-12-23 DIAGNOSIS — O24.414 GESTATIONAL DIABETES MELLITUS (GDM) REQUIRING INSULIN: ICD-10-CM

## 2022-12-23 DIAGNOSIS — O24.414 INSULIN CONTROLLED GESTATIONAL DIABETES MELLITUS (GDM) IN THIRD TRIMESTER: Primary | ICD-10-CM

## 2022-12-23 DIAGNOSIS — O09.90 HIGH-RISK PREGNANCY, UNSPECIFIED TRIMESTER: Primary | ICD-10-CM

## 2022-12-23 DIAGNOSIS — O24.414 INSULIN CONTROLLED GESTATIONAL DIABETES MELLITUS (GDM) IN THIRD TRIMESTER: ICD-10-CM

## 2022-12-23 PROCEDURE — G0463 HOSPITAL OUTPT CLINIC VISIT: HCPCS | Mod: 27 | Performed by: ADVANCED PRACTICE MIDWIFE

## 2022-12-23 PROCEDURE — G0463 HOSPITAL OUTPT CLINIC VISIT: HCPCS | Performed by: FAMILY MEDICINE

## 2022-12-23 PROCEDURE — 99207 PR PRENATAL VISIT: CPT | Performed by: ADVANCED PRACTICE MIDWIFE

## 2022-12-23 PROCEDURE — 99214 OFFICE O/P EST MOD 30 MIN: CPT | Performed by: FAMILY MEDICINE

## 2022-12-23 ASSESSMENT — PAIN SCALES - GENERAL: PAINLEVEL: NO PAIN (0)

## 2022-12-23 NOTE — LETTER
"2022       RE: Elham Hansen  [address]  [place] [address]     Dear Colleague,    Thank you for referring your patient, Elham Hansen, to the [facility] [place] at [facility]. Please see a copy of my visit note below.    Subjective:      32 year old  at 32w1d presentst for a routine prenatal appointment.    no vaginal bleeding or leakage of fluid.  no contractions. pos fetal movement.       No HA, visual changes, RUQ or epigastric pain.   Patient concerns:    Feeling well overall. Just saw Vadim, will increase insulin to cover elevated fasting BS  Reviewed EOB labs with patient.  Reviewed TDAP Previously given  Objective:  Vitals:    22 0904   BP: 106/74   BP Location: Left arm   Patient Position: Sitting   Cuff Size: Adult Regular   Pulse: 75   Resp: 18   Weight: 69.9 kg (154 lb)   Height: 1.6 m (5' 3\")   , see ob flowsheet  Assessment/Plan     Encounter Diagnoses   Name Primary?     Insulin controlled gestational diabetes mellitus (GDM) in third trimester      High-risk pregnancy, unspecified trimester Yes     Gestational diabetes mellitus (GDM) requiring insulin      Orders Placed This Encounter   Procedures     US OB Fetal Biophys Prf wo NST Singls W/Ltd     US OB Follow Up >14 Weeks     US OB Fetal Biophys Prf wo NST Singls W/Ltd     US OB Fetal Biophys Prf wo NonStrs Singls Sgl     US OB Fetal Biophys Prf wo NonStrs Singls Sgl     US OB Fetal Biophys Prf wo NonStrs Singls Sgl     US OB Fetal Biophys Prf wo NonStrs Singls Sgl     US OB Fetal Biophys Prf wo NonStrs Singls Sgl     US OB Fetal Biophys Prf wo NonStrs Singls Sgl     US OB Follow Up >14 Weeks     US OB Fetal Biophys Prf wo NonStrs Singls Sgl     US OB Fetal Biophys Prf wo NonStrs Singls Sgl     No orders of the defined types were placed in this encounter.    Antibody Screen   Date Value Ref Range Status   2022 Negative Negative Final   , " Rhogam  was notgiven.    - Reviewed total weight gain, encouraged continued healthy diet and exercise.  .  Reviewed importance of daily fetal kick count and why/how to contact provider.    - Reviewed why/how to contact provider if headache/visual changes/RUQ or epigastric pain, decreased fetal movement, vaginal bleeding, leakage of fluid or more than 4 contractions in an hour.     Patient education/orders or handouts today:  PTL signs/symptoms and Childbirth Ed classes  Reviewed GBS screening at 35-36 wks.    Return to clinic in 2 weeks and prn if questions or concerns. BPPs and growth US ordered through 2/7    FIDENCIO Damon CNM          Again, thank you for allowing me to participate in the care of your patient.      Sincerely,    FIDENCIO Damon CNM

## 2022-12-23 NOTE — LETTER
Date:December 23, 2022      Provider requested that no letter be sent. Do not send.       [facility]

## 2022-12-23 NOTE — NURSING NOTE
Chief Complaint   Patient presents with     Follow Up     GDM consult  OB 32 weeks and 1 day   Chata Henry LPN

## 2022-12-23 NOTE — PROGRESS NOTES
"Subjective:      32 year old  at 32w1d presentst for a routine prenatal appointment.    no vaginal bleeding or leakage of fluid.  no contractions. pos fetal movement.       No HA, visual changes, RUQ or epigastric pain.   Patient concerns:    Feeling well overall. Just saw Vadim, will increase insulin to cover elevated fasting BS  Reviewed EOB labs with patient.  Reviewed TDAP Previously given  Objective:  Vitals:    22 0904   BP: 106/74   BP Location: Left arm   Patient Position: Sitting   Cuff Size: Adult Regular   Pulse: 75   Resp: 18   Weight: 69.9 kg (154 lb)   Height: 1.6 m (5' 3\")   , see ob flowsheet  Assessment/Plan     Encounter Diagnoses   Name Primary?     Insulin controlled gestational diabetes mellitus (GDM) in third trimester      High-risk pregnancy, unspecified trimester Yes     Gestational diabetes mellitus (GDM) requiring insulin      Orders Placed This Encounter   Procedures     US OB Fetal Biophys Prf wo NST Singls W/Ltd     US OB Follow Up >14 Weeks     US OB Fetal Biophys Prf wo NST Singls W/Ltd     US OB Fetal Biophys Prf wo NonStrs Singls Sgl     US OB Fetal Biophys Prf wo NonStrs Singls Sgl     US OB Fetal Biophys Prf wo NonStrs Singls Sgl     US OB Fetal Biophys Prf wo NonStrs Singls Sgl     US OB Fetal Biophys Prf wo NonStrs Singls Sgl     US OB Fetal Biophys Prf wo NonStrs Singls Sgl     US OB Follow Up >14 Weeks     US OB Fetal Biophys Prf wo NonStrs Singls Sgl     US OB Fetal Biophys Prf wo NonStrs Singls Sgl     No orders of the defined types were placed in this encounter.    Antibody Screen   Date Value Ref Range Status   2022 Negative Negative Final   , Rhogam  was notgiven.    - Reviewed total weight gain, encouraged continued healthy diet and exercise.  .  Reviewed importance of daily fetal kick count and why/how to contact provider.    - Reviewed why/how to contact provider if headache/visual changes/RUQ or epigastric pain, decreased fetal movement, vaginal " bleeding, leakage of fluid or more than 4 contractions in an hour.     Patient education/orders or handouts today:  PTL signs/symptoms and Childbirth Ed classes  Reviewed GBS screening at 35-36 wks.    Return to clinic in 2 weeks and prn if questions or concerns. BPPs and growth US ordered through 2/7    Meri Lozano, APRN ESCOBAR

## 2023-01-02 ENCOUNTER — HOSPITAL ENCOUNTER (OUTPATIENT)
Dept: ULTRASOUND IMAGING | Facility: CLINIC | Age: 33
Discharge: HOME | End: 2023-01-02
Attending: ADVANCED PRACTICE MIDWIFE | Admitting: ADVANCED PRACTICE MIDWIFE
Payer: COMMERCIAL

## 2023-01-02 DIAGNOSIS — O09.90 HIGH-RISK PREGNANCY, UNSPECIFIED TRIMESTER: ICD-10-CM

## 2023-01-02 DIAGNOSIS — O24.414 INSULIN CONTROLLED GESTATIONAL DIABETES MELLITUS (GDM) IN THIRD TRIMESTER: ICD-10-CM

## 2023-01-02 PROCEDURE — 76819 FETAL BIOPHYS PROFIL W/O NST: CPT | Mod: 26 | Performed by: RADIOLOGY

## 2023-01-02 PROCEDURE — 76819 FETAL BIOPHYS PROFIL W/O NST: CPT

## 2023-01-04 ENCOUNTER — ANCILLARY PROCEDURE (OUTPATIENT)
Dept: ULTRASOUND IMAGING | Facility: CLINIC | Age: 33
End: 2023-01-04
Attending: ADVANCED PRACTICE MIDWIFE
Payer: COMMERCIAL

## 2023-01-04 DIAGNOSIS — O09.90 HIGH-RISK PREGNANCY, UNSPECIFIED TRIMESTER: ICD-10-CM

## 2023-01-04 DIAGNOSIS — O24.414 INSULIN CONTROLLED GESTATIONAL DIABETES MELLITUS (GDM) IN THIRD TRIMESTER: ICD-10-CM

## 2023-01-04 PROCEDURE — 76819 FETAL BIOPHYS PROFIL W/O NST: CPT | Mod: 26 | Performed by: OBSTETRICS & GYNECOLOGY

## 2023-01-04 PROCEDURE — 76819 FETAL BIOPHYS PROFIL W/O NST: CPT

## 2023-01-09 ENCOUNTER — HOSPITAL ENCOUNTER (OUTPATIENT)
Dept: ULTRASOUND IMAGING | Facility: CLINIC | Age: 33
Discharge: HOME | End: 2023-01-09
Attending: ADVANCED PRACTICE MIDWIFE | Admitting: ADVANCED PRACTICE MIDWIFE
Payer: COMMERCIAL

## 2023-01-09 DIAGNOSIS — Z34.02 ENCOUNTER FOR SUPERVISION OF NORMAL FIRST PREGNANCY IN SECOND TRIMESTER: ICD-10-CM

## 2023-01-09 DIAGNOSIS — O24.414 INSULIN CONTROLLED GESTATIONAL DIABETES MELLITUS (GDM) IN THIRD TRIMESTER: ICD-10-CM

## 2023-01-09 DIAGNOSIS — O09.90 HIGH-RISK PREGNANCY, UNSPECIFIED TRIMESTER: ICD-10-CM

## 2023-01-09 PROCEDURE — 76819 FETAL BIOPHYS PROFIL W/O NST: CPT | Mod: 59

## 2023-01-09 PROCEDURE — 76816 OB US FOLLOW-UP PER FETUS: CPT

## 2023-01-09 PROCEDURE — 76819 FETAL BIOPHYS PROFIL W/O NST: CPT | Mod: 26 | Performed by: RADIOLOGY

## 2023-01-10 ENCOUNTER — OFFICE VISIT (OUTPATIENT)
Dept: OBGYN | Facility: CLINIC | Age: 33
End: 2023-01-10
Attending: ADVANCED PRACTICE MIDWIFE
Payer: COMMERCIAL

## 2023-01-10 ENCOUNTER — OFFICE VISIT (OUTPATIENT)
Dept: FAMILY MEDICINE | Facility: CLINIC | Age: 33
End: 2023-01-10
Attending: FAMILY MEDICINE
Payer: COMMERCIAL

## 2023-01-10 VITALS
BODY MASS INDEX: 27.82 KG/M2 | WEIGHT: 157 LBS | HEART RATE: 67 BPM | HEIGHT: 63 IN | SYSTOLIC BLOOD PRESSURE: 113 MMHG | DIASTOLIC BLOOD PRESSURE: 79 MMHG

## 2023-01-10 VITALS
BODY MASS INDEX: 27.93 KG/M2 | WEIGHT: 157.6 LBS | SYSTOLIC BLOOD PRESSURE: 113 MMHG | DIASTOLIC BLOOD PRESSURE: 79 MMHG | HEART RATE: 67 BPM | HEIGHT: 63 IN

## 2023-01-10 DIAGNOSIS — O24.414 INSULIN CONTROLLED GESTATIONAL DIABETES MELLITUS (GDM) IN THIRD TRIMESTER: ICD-10-CM

## 2023-01-10 DIAGNOSIS — O24.414 GESTATIONAL DIABETES MELLITUS (GDM) REQUIRING INSULIN: Primary | ICD-10-CM

## 2023-01-10 DIAGNOSIS — O09.93 HRP (HIGH RISK PREGNANCY), THIRD TRIMESTER: Primary | ICD-10-CM

## 2023-01-10 PROCEDURE — 99207 PR PRENATAL VISIT: CPT | Performed by: ADVANCED PRACTICE MIDWIFE

## 2023-01-10 PROCEDURE — 99213 OFFICE O/P EST LOW 20 MIN: CPT | Performed by: FAMILY MEDICINE

## 2023-01-10 PROCEDURE — G0463 HOSPITAL OUTPT CLINIC VISIT: HCPCS | Performed by: FAMILY MEDICINE

## 2023-01-10 ASSESSMENT — ANXIETY QUESTIONNAIRES
GAD7 TOTAL SCORE: 1
6. BECOMING EASILY ANNOYED OR IRRITABLE: NOT AT ALL
GAD7 TOTAL SCORE: 1
5. BEING SO RESTLESS THAT IT IS HARD TO SIT STILL: NOT AT ALL
2. NOT BEING ABLE TO STOP OR CONTROL WORRYING: NOT AT ALL
3. WORRYING TOO MUCH ABOUT DIFFERENT THINGS: NOT AT ALL
1. FEELING NERVOUS, ANXIOUS, OR ON EDGE: SEVERAL DAYS
7. FEELING AFRAID AS IF SOMETHING AWFUL MIGHT HAPPEN: NOT AT ALL
IF YOU CHECKED OFF ANY PROBLEMS ON THIS QUESTIONNAIRE, HOW DIFFICULT HAVE THESE PROBLEMS MADE IT FOR YOU TO DO YOUR WORK, TAKE CARE OF THINGS AT HOME, OR GET ALONG WITH OTHER PEOPLE: NOT DIFFICULT AT ALL

## 2023-01-10 ASSESSMENT — PATIENT HEALTH QUESTIONNAIRE - PHQ9
SUM OF ALL RESPONSES TO PHQ QUESTIONS 1-9: 2
5. POOR APPETITE OR OVEREATING: NOT AT ALL

## 2023-01-10 NOTE — LETTER
Date:January 12, 2023      Provider requested that no letter be sent. Do not send.       [facility]      
"1/10/2023       RE: Elham Hansen  [address]  [facility] [address]     Dear Colleague,    Thank you for referring your patient, Elham Hansen, to the [facility] [place] at [facility]. Please see a copy of my visit note below.    Subjective:      33 year old  at 34w5d presents for a routine prenatal appointment.         Denies vaginal bleeding,  leakage of fluid, or change in vaginal discharge.  Denies regular contractions.  Reports regular fetal movement.     No HA, visual changes, RUQ or epigastric pain.   Patient concerns: Continues to check blood sugar QID.  All readings within goal.  Had BPP with growth US yesterday, reviewed final report in EMR.  Feeling well overall.   Objective:  Vitals:    01/10/23 1104   BP: 113/79   Pulse: 67   Weight: 71.2 kg (157 lb)   Height: 1.6 m (5' 3\")    See OB flowsheet    Assessment/Plan     Encounter Diagnosis   Name Primary?     HRP (high risk pregnancy), third trimester Yes           PHQ-9 SCORE 2022 2022 1/10/2023   PHQ-9 Total Score 5 1 2     PHQ 2022 2022 1/10/2023   PHQ-9 Total Score 5 1 2   Q9: Thoughts of better off dead/self-harm past 2 weeks Not at all Not at all Not at all       Reviewed why/how to contact provider if headache/visual changes/RUQ or epigastric pain, decreased fetal movement, vaginal bleeding, leakage of fluid.   Return to clinic in 1 week and prn if questions or concerns.     FIDENCIO Nieto CNM      Again, thank you for allowing me to participate in the care of your patient.      Sincerely,    FIDENCIO Nieto CNM      "
English

## 2023-01-10 NOTE — PROGRESS NOTES
"  [facility]    HPI:  Elham Hansen is a 32 year old  at 34w5d who comes in for a follow-up of gestational diabetes. She is taking insulin detemir 12 units at bedtime. No side effects. No hypoglycemia.     She is checking her blood sugars regularly, including Fasting and 1-hour post-prandial. She is walking a lot for exercise. Having a nighttime snack.     Blood Glucose Log:   Date Fasting Post Breakfast Post Lunch Post Supper   1/10 89 105      85 118 122 118    85 139 135 122    95 98 101 102    84 124 120 140    84 110      84 123 130 95   1/3 89 116 140 103       Diabetes Symptoms:   None      Physical Exam:   /79   Pulse 67   Ht 1.6 m (5' 3\")   Wt 71.5 kg (157 lb 9.6 oz)   LMP 2022 (Exact Date)   BMI 27.92 kg/m    No acute distress.     Assessment:   Elham Hansen is a 32 year old  at 34w5d with a diagnosis of gestational diabetes. After reviewing her blood sugars, more than 50% of fasting blood sugars are at target. Based on this, I recommend Continued dietary and life-style modifications, continuation of insulin therapy.     Plan:   Continue dietary modifications, regular exercise as able  Continue to check blood glucose 4x daily  Bring glucose log to all appointments  Needs 2-hr GTT at 6 wks post-partum  Insulin dose - continue 12 units at bedtime. Reviewed s/sx of hypoglycemia and treatment.   Follow-up with Dr. Fierro, Dr. Brooks, or Dr. Royal in 2 week(s)      Ella Fierro MD  "

## 2023-01-10 NOTE — PROGRESS NOTES
"Subjective:      33 year old  at 34w5d presents for a routine prenatal appointment.         Denies vaginal bleeding,  leakage of fluid, or change in vaginal discharge.  Denies regular contractions.  Reports regular fetal movement.     No HA, visual changes, RUQ or epigastric pain.   Patient concerns: Continues to check blood sugar QID.  All readings within goal.  Had BPP with growth US yesterday, reviewed final report in EMR.  Feeling well overall.   Objective:  Vitals:    01/10/23 1104   BP: 113/79   Pulse: 67   Weight: 71.2 kg (157 lb)   Height: 1.6 m (5' 3\")    See OB flowsheet    Assessment/Plan     Encounter Diagnosis   Name Primary?     HRP (high risk pregnancy), third trimester Yes           PHQ-9 SCORE 2022 2022 1/10/2023   PHQ-9 Total Score 5 1 2     PHQ 2022 2022 1/10/2023   PHQ-9 Total Score 5 1 2   Q9: Thoughts of better off dead/self-harm past 2 weeks Not at all Not at all Not at all       Reviewed why/how to contact provider if headache/visual changes/RUQ or epigastric pain, decreased fetal movement, vaginal bleeding, leakage of fluid.   Return to clinic in 1 week and prn if questions or concerns.     FIDENCIO Nieto CNM  "

## 2023-01-12 ENCOUNTER — HOSPITAL ENCOUNTER (OUTPATIENT)
Dept: ULTRASOUND IMAGING | Facility: CLINIC | Age: 33
Discharge: HOME | End: 2023-01-12
Attending: ADVANCED PRACTICE MIDWIFE | Admitting: ADVANCED PRACTICE MIDWIFE
Payer: COMMERCIAL

## 2023-01-12 DIAGNOSIS — O24.414 INSULIN CONTROLLED GESTATIONAL DIABETES MELLITUS (GDM) IN THIRD TRIMESTER: ICD-10-CM

## 2023-01-12 DIAGNOSIS — O09.90 HIGH-RISK PREGNANCY, UNSPECIFIED TRIMESTER: ICD-10-CM

## 2023-01-12 PROCEDURE — 76819 FETAL BIOPHYS PROFIL W/O NST: CPT

## 2023-01-12 PROCEDURE — 76819 FETAL BIOPHYS PROFIL W/O NST: CPT | Mod: 26 | Performed by: RADIOLOGY

## 2023-01-16 ENCOUNTER — ANCILLARY PROCEDURE (OUTPATIENT)
Dept: ULTRASOUND IMAGING | Facility: CLINIC | Age: 33
End: 2023-01-16
Attending: ADVANCED PRACTICE MIDWIFE
Payer: COMMERCIAL

## 2023-01-16 DIAGNOSIS — O09.90 HIGH-RISK PREGNANCY, UNSPECIFIED TRIMESTER: ICD-10-CM

## 2023-01-16 DIAGNOSIS — O24.414 INSULIN CONTROLLED GESTATIONAL DIABETES MELLITUS (GDM) IN THIRD TRIMESTER: ICD-10-CM

## 2023-01-16 PROCEDURE — 76816 OB US FOLLOW-UP PER FETUS: CPT

## 2023-01-16 PROCEDURE — 76819 FETAL BIOPHYS PROFIL W/O NST: CPT | Mod: 26 | Performed by: OBSTETRICS & GYNECOLOGY

## 2023-01-16 PROCEDURE — 76816 OB US FOLLOW-UP PER FETUS: CPT | Mod: 26 | Performed by: OBSTETRICS & GYNECOLOGY

## 2023-01-20 ENCOUNTER — ANCILLARY PROCEDURE (OUTPATIENT)
Dept: ULTRASOUND IMAGING | Facility: CLINIC | Age: 33
End: 2023-01-20
Attending: ADVANCED PRACTICE MIDWIFE
Payer: COMMERCIAL

## 2023-01-20 DIAGNOSIS — O09.90 HIGH-RISK PREGNANCY, UNSPECIFIED TRIMESTER: ICD-10-CM

## 2023-01-20 DIAGNOSIS — O24.414 INSULIN CONTROLLED GESTATIONAL DIABETES MELLITUS (GDM) IN THIRD TRIMESTER: ICD-10-CM

## 2023-01-20 PROCEDURE — 76819 FETAL BIOPHYS PROFIL W/O NST: CPT

## 2023-01-20 PROCEDURE — 76819 FETAL BIOPHYS PROFIL W/O NST: CPT | Mod: 26 | Performed by: OBSTETRICS & GYNECOLOGY

## 2023-01-23 ENCOUNTER — ANCILLARY PROCEDURE (OUTPATIENT)
Dept: ULTRASOUND IMAGING | Facility: CLINIC | Age: 33
End: 2023-01-23
Attending: ADVANCED PRACTICE MIDWIFE
Payer: COMMERCIAL

## 2023-01-23 DIAGNOSIS — O09.90 HIGH-RISK PREGNANCY, UNSPECIFIED TRIMESTER: ICD-10-CM

## 2023-01-23 DIAGNOSIS — O24.414 INSULIN CONTROLLED GESTATIONAL DIABETES MELLITUS (GDM) IN THIRD TRIMESTER: ICD-10-CM

## 2023-01-23 PROCEDURE — 76819 FETAL BIOPHYS PROFIL W/O NST: CPT | Mod: 26 | Performed by: OBSTETRICS & GYNECOLOGY

## 2023-01-23 PROCEDURE — 76819 FETAL BIOPHYS PROFIL W/O NST: CPT

## 2023-01-27 ENCOUNTER — OFFICE VISIT (OUTPATIENT)
Dept: OBGYN | Facility: CLINIC | Age: 33
End: 2023-01-27
Attending: ADVANCED PRACTICE MIDWIFE
Payer: COMMERCIAL

## 2023-01-27 ENCOUNTER — HOSPITAL ENCOUNTER (OUTPATIENT)
Facility: CLINIC | Age: 33
Discharge: HOME | End: 2023-01-27
Attending: ADVANCED PRACTICE MIDWIFE | Admitting: ADVANCED PRACTICE MIDWIFE
Payer: COMMERCIAL

## 2023-01-27 ENCOUNTER — ANCILLARY PROCEDURE (OUTPATIENT)
Dept: ULTRASOUND IMAGING | Facility: CLINIC | Age: 33
End: 2023-01-27
Attending: ADVANCED PRACTICE MIDWIFE
Payer: COMMERCIAL

## 2023-01-27 ENCOUNTER — HOSPITAL ENCOUNTER (OUTPATIENT)
Facility: CLINIC | Age: 33
End: 2023-01-27
Admitting: ADVANCED PRACTICE MIDWIFE
Payer: COMMERCIAL

## 2023-01-27 VITALS
DIASTOLIC BLOOD PRESSURE: 87 MMHG | BODY MASS INDEX: 28 KG/M2 | WEIGHT: 158 LBS | HEIGHT: 63 IN | HEART RATE: 69 BPM | SYSTOLIC BLOOD PRESSURE: 118 MMHG

## 2023-01-27 VITALS — SYSTOLIC BLOOD PRESSURE: 127 MMHG | TEMPERATURE: 98.4 F | RESPIRATION RATE: 18 BRPM | DIASTOLIC BLOOD PRESSURE: 82 MMHG

## 2023-01-27 DIAGNOSIS — O09.90 HIGH-RISK PREGNANCY, UNSPECIFIED TRIMESTER: ICD-10-CM

## 2023-01-27 DIAGNOSIS — O09.93 HRP (HIGH RISK PREGNANCY), THIRD TRIMESTER: Primary | ICD-10-CM

## 2023-01-27 DIAGNOSIS — O24.414 INSULIN CONTROLLED GESTATIONAL DIABETES MELLITUS (GDM) IN THIRD TRIMESTER: ICD-10-CM

## 2023-01-27 PROCEDURE — 76818 FETAL BIOPHYS PROFILE W/NST: CPT | Mod: 26 | Performed by: STUDENT IN AN ORGANIZED HEALTH CARE EDUCATION/TRAINING PROGRAM

## 2023-01-27 PROCEDURE — 99213 OFFICE O/P EST LOW 20 MIN: CPT | Mod: 25 | Performed by: ADVANCED PRACTICE MIDWIFE

## 2023-01-27 PROCEDURE — 76819 FETAL BIOPHYS PROFIL W/O NST: CPT

## 2023-01-27 PROCEDURE — 2894A PR VOIDCORRECT: CPT | Mod: 26 | Performed by: ADVANCED PRACTICE MIDWIFE

## 2023-01-27 PROCEDURE — 59025 FETAL NON-STRESS TEST: CPT

## 2023-01-27 PROCEDURE — G0463 HOSPITAL OUTPT CLINIC VISIT: HCPCS | Mod: 25 | Performed by: ADVANCED PRACTICE MIDWIFE

## 2023-01-27 PROCEDURE — G0463 HOSPITAL OUTPT CLINIC VISIT: HCPCS | Mod: 25

## 2023-01-27 PROCEDURE — 99207 PR PRENATAL VISIT: CPT | Performed by: ADVANCED PRACTICE MIDWIFE

## 2023-01-27 PROCEDURE — 87653 STREP B DNA AMP PROBE: CPT | Performed by: ADVANCED PRACTICE MIDWIFE

## 2023-01-27 RX ORDER — LIDOCAINE 40 MG/G
CREAM TOPICAL
Status: DISCONTINUED | OUTPATIENT
Start: 2023-01-27 | End: 2023-01-27 | Stop reason: HOSPADM

## 2023-01-27 ASSESSMENT — ACTIVITIES OF DAILY LIVING (ADL): ADLS_ACUITY_SCORE: 31

## 2023-01-27 NOTE — PROGRESS NOTES
HOSPITAL TRIAGE NOTE  ===================    CHIEF COMPLAINT  ========================  Elham Agarwal is a 33 year old patient presenting today at 37w1d for evaluation of fetal status, needs extended monitoring s/p NST in cliinic w 2 variables noted, but also mod variability and + accels  Here w , feeling well.    Patient's last menstrual period was 2022 (exact date).  Estimated Date of Delivery: 2023   Sent over from clinic after BPP 6/8, marked off for breathing, and 20 EFM in clinc w +accels, mod variability, and 2 variables    HPI  ==================     Prenatal record and labs reviewed from [facility], through [facility] EMR.    CONTRACTIONS: none  ABDOMINAL PAIN: none  FETAL MOVEMENT: active    VAGINAL BLEEDING: none  RUPTURE OF MEMBRANES: no  PELVIC PAIN: none    PREGNANCY COMPLICATIONS: Gestational diabetes   OTHER:     # Pain Assessment:  Current Pain Score 2023   Patient currently in pain? denies         REVIEW OF SYSTEMS  =====================  C: NEGATIVE for fever, chills  I: NEGATIVE for worrisome rashes, moles or lesions  E: NEGATIVE for vision changes or irritation  R: NEGATIVE for significant cough or SOB  CV: NEGATIVE for chest pain, palpitations or varicosities  GI: NEGATIVE for nausea, abdominal pain, heartburn, or change in bowel habits  : NEGATIVE for frequency, dysuria, or hematuria  M: NEGATIVE for significant arthralgias or myalgia  N: NEGATIVE for headache, weakness, dizziness or paresthesias  P: NEGATIVE for changes in mood or affect    PROBLEM LIST  ===============  Patient Active Problem List    Diagnosis Date Noted    Gestational diabetes mellitus (GDM) requiring insulin 2022     Priority: Medium     22: poorly controlled so far, consult with Vadim after visit today to discuss need for insulin. BPPs and growth US ordered for presumed GDMA2  : increased insulin w Vadim consult.  BPPs 2x week and growth q 3-4 wks,  started discussion re IOL between 39-39.6      Vitamin D deficiency 09/20/2022     Priority: Medium     Vit d = 27, started supplement  12/9: vit d most recent 39      Dermoid cyst 08/08/2022     Priority: Medium     8/5/22- MFM US- rt dermoid cyst [id number]  10/7- MFM US rt cyst still present, recommend re-imaging postpartum      High-risk pregnancy, unspecified trimester 08/08/2022     Priority: Medium     [facility] CNM pt  Partner's name: Jaxson  [ ] NOB folder  [ ] Dating  [x ] First tri screen completed  [ ] QS/AFP ordered declined  [x ] Fetal anatomy US ordered  [x ] Rubella immune  [ ] Hep B immune/nonimmune  [x ] Pap completed 2021 in [place]  [ ] Started ASA   [ ] NO YES plan utox in labor   [ ] COVID vaccine completed  _____________________________________  [x ] EOB folder  [x ] PP Contraception plan: condoms   [x ] Labor plans: hopes for un-medicated but may want epidural,  to come for labor  [na ] :  [x ] Infant feeding plan: breast   [X ] FLU shot  [x ] TDAP given  [NA] Rhogam if needed, date:  [ NA] TOLAC consent done  [NA ] Waterbirth declines, consent done  [x ] GCT, failed, GDM  ________________________________________  [ ] OTC PP meds sent  [ ] PP plans, time off, support system discussed, resources offered  [ ] Planning CS-ERAS pkt8/5/22- MFM US- first trimester screening wnl, NIPT____, AFP only at 15 weeks____         HISTORIES  ==============  ALLERGIES:      Allergies   Allergen Reactions    Misc. Sulfonamide Containing Compounds Dermatitis and Rash    Penicillins Dermatitis, Itching and Rash     PAST MEDICAL HISTORY  Past Medical History:   Diagnosis Date    Gestational diabetes 2022    Varicose veins of lower extremity 2008     SOCIAL HISTORY  Social History     Socioeconomic History    Marital status:      Spouse name: Not on file    Number of children: Not on file    Years of education: Not on file    Highest education level: Not on file   Occupational History    Not on file    Tobacco Use    Smoking status: Never    Smokeless tobacco: Never   Substance and Sexual Activity    Alcohol use: Not Currently    Drug use: Never    Sexual activity: Yes     Partners: Male     Birth control/protection: None   Other Topics Concern    Not on file   Social History Narrative    Not on file     Social Determinants of Health     Financial Resource Strain: Not on file   Food Insecurity: Not on file   Transportation Needs: Not on file   Physical Activity: Not on file   Stress: Not on file   Social Connections: Not on file   Intimate Partner Violence: Not At Risk    Fear of Current or Ex-Partner: No    Emotionally Abused: No    Physically Abused: No    Sexually Abused: No   Housing Stability: Not on file       FAMILY HISTORY  Family History   Problem Relation Age of Onset    Anxiety Disorder Mother     Prostate Cancer Maternal Grandfather     Other Cancer Maternal Grandfather     Hypertension Maternal Grandmother     Depression Maternal Grandmother     Diabetes Paternal Grandfather     Hypertension Paternal Grandmother      OB HISTORY  OB History    Para Term  AB Living   1 0 0 0 0 0   SAB IAB Ectopic Multiple Live Births   0 0 0 0 0      # Outcome Date GA Lbr Joss/2nd Weight Sex Delivery Anes PTL Lv   1 Current              Prenatal Labs:   Lab Results   Component Value Date    AS Negative 2022    HEPBANG Nonreactive 2022    RUQIGG Positive 2022    HGB 12.1 2022         EXAM  ============  /82   Temp 98.4  F (36.9  C)   Resp 18   LMP 2022 (Exact Date)   Breastfeeding No   GENERAL APPEARANCE: healthy, alert and no distress  RESP: lungs clear to auscultation - no rales, rhonchi or wheezes  CV: regular rates and rhythm, normal S1 S2, no S3 or S4 and no murmur,and no varicosities  ABDOMEN:  soft, nontender, no epigastric pain  SKIN: no suspicious lesions or rashes  NEURO: Denies headache, blurred vision, other vision changes  PSYCH: mentation appears  normal. and affect normal/bright  MS/ LEGS: Reflexes normal bilaterally    CONTRACTIONS: none   FETAL HEART TONES: continuous EFM- baseline 130 with moderate variability and positive accelerations. No decelerations.  NST: REACTIVE      PELVIC EXAM: deferred    Fetal Non-Stress Test Results    NST Ordered By: Meri Lozano, AISHA, APRN, CNM, FACNM    NST Medical Indication: BPP 6/8    NST Start & Stop Times  NST Start Time: 1130  NST Stop Time: 1150    NST Results  Fetus A   Baseline Rate: 130  Accelerations: Present  Decelerations: None  Interpretation: reactive      DIAGNOSIS  ============  37w1d seen on the [facility] Triage     NST: REACTIVE  Fetal Heart rate tracing:category one    PLAN  ============  Discharge to home with labor instuctions per discharge instruction form  Call or return to the [facility] with contractions, cramping, abdominal or pelvic pain, vaginal bleeding, leaking fluid or decreased fetal movement.  Follow up- w BPP US 2 x week, pt and  will walk over to clinic to schedule  Discussed GDM and insulin control, importance of tight control and when to call w elevated BS.  Will make BPP appointments for next, M and F, KEIRY meredith CNM.    FIDENCIO Damon CNM

## 2023-01-27 NOTE — DISCHARGE INSTRUCTIONS
Discharge Instruction for Undelivered Patients      You were seen for: Fetal Assessment  We Consulted: Meri FERNANDEZM  You had (Test or Medicine): fetal and uterine monitoring     Diet:   Drink 8 to 12 glasses of liquids (milk, juice, water) every day.  You may eat meals and snacks.  To manage your diabetes, follow the guidelines for eating and drinking given to you by your Clinic Provider or Diabetes Educator.       Activity:  Count fetal kicks everyday (see handout)  Call your doctor or nurse midwife if your baby is moving less than usual.     Call your provider if you notice:  Swelling in your face or increased swelling in your hands or legs.  Headaches that are not relieved by Tylenol (acetaminophen).  Changes in your vision (blurring: seeing spots or stars.)  Nausea (sick to your stomach) and vomiting (throwing up).   Weight gain of 5 pounds or more per week.  Heartburn that doesn't go away.  Signs of bladder infection: pain when you urinate (use the toilet), need to go more often and more urgently.  The bag of cedeno (rupture of membranes) breaks, or you notice leaking in your underwear.  Bright red blood in your underwear.  Abdominal (lower belly) or stomach pain.  For first baby: Contractions (tightening) less than 5 minutes apart for one hour or more.  Increase or change in vaginal discharge (note the color and amount)      Follow-up:  Make appointments for ultrasounds 2 times a week and see the provider 1 time per week.

## 2023-01-27 NOTE — PROGRESS NOTES
"Subjective:      33 year old  at 37w1d presents for a routine prenatal appointment.         Denies vaginal bleeding,  leakage of fluid, or change in vaginal discharge.  Denies contractions.  Reports regular fetal movement.     No HA, visual changes, RUQ or epigastric pain.   Patient concerns: Continues to check glucose QID, all post-prandial readings 1hr after meals   Fasting readings are within goal.  BPP completed today 6/8 points off for breathing.  EFM applied, FHT's with baseline 140, moderate variability with accelerations.  Two variable decelerations noted with itz to  100 and 110  Objective:  Vitals:    23 1003   BP: 118/87   BP Location: Left arm   Patient Position: Chair   Pulse: 69   Height: 1.6 m (5' 3\")    See OB flowsheet  NST non-reactive, secondary to non-recurrent variable decelerations.    Assessment/Plan     Encounter Diagnosis   Name Primary?     HRP (high risk pregnancy), third trimester Yes           PHQ-9 SCORE 2022 2022 1/10/2023   PHQ-9 Total Score 5 1 2     GBS screening: Obtained. Today     Plan to send patient to Labor and Delivery for extended monitoring and possible induction of labor due to elevated glucose readings.    Patient and  aware of plan and agree to present to labor and delivery.  FIDENCIO Nieto CNM      "

## 2023-01-27 NOTE — PATIENT INSTRUCTIONS
Thank you for trusting us with your care!     If you need to contact us for questions about:  Symptoms, Scheduling & Medical Questions; Non-urgent (2-3 day response) Cari message, Urgent (needing response today) [phone] (if after 3:30pm next day response)   Prescriptions: Please call your Pharmacy   Billing: Danish [phone] or [facility]:[phone]

## 2023-01-27 NOTE — PLAN OF CARE
Goal Outcome Evaluation:                      Reactive NST. Meri Page CNM in to see pt. Order placed for discharge. Pt given discharge instructions and agrees with plan. Will walk over to the clinic to make appointments for BPP's and CNM visits. Discharged at 1230.

## 2023-01-27 NOTE — PLAN OF CARE
Data: Patient presented to [facility] at 1055.   Reason for maternal/fetal assessment per patient is No chief complaint on file.  .  Patient is a . Prenatal record reviewed.      OB History    Para Term  AB Living   1 0 0 0 0 0   SAB IAB Ectopic Multiple Live Births   0 0 0 0 0      # Outcome Date GA Lbr Joss/2nd Weight Sex Delivery Anes PTL Lv   1 Current            . Medical history:   Past Medical History:   Diagnosis Date     Gestational diabetes      Varicose veins of lower extremity    . Gestational Age 37w1d. VSS. Fetal movement present. Patient denies cramping, backache, vaginal discharge, pelvic pressure, UTI symptoms, GI problems, bloody show, vaginal bleeding, edema, headache, visual disturbances, epigastric or URQ pain, abdominal pain, rupture of membranes. Support persons Jaxson present.  Action: Verbal consent for EFM. Triage assessment completed. EFM applied for fetal monitoring. Uterine assessment see flowsheet. Fetal assessment: Presumed adequate fetal oxygenation documented (see flow record).   Response: Meri Page CNM informed of arrival. Plan per provider is extended monitoring. Patient verbalized agreement with plan. Patient transferred to room 477 ambulatory, oriented to room and call light.

## 2023-01-27 NOTE — LETTER
Date:January 27, 2023      Provider requested that no letter be sent. Do not send.       [facility]

## 2023-01-28 LAB — GP B STREP DNA SPEC QL NAA+PROBE: NEGATIVE

## 2023-01-30 ENCOUNTER — ANCILLARY PROCEDURE (OUTPATIENT)
Dept: ULTRASOUND IMAGING | Facility: CLINIC | Age: 33
End: 2023-01-30
Attending: ADVANCED PRACTICE MIDWIFE
Payer: COMMERCIAL

## 2023-01-30 DIAGNOSIS — O09.90 HIGH-RISK PREGNANCY, UNSPECIFIED TRIMESTER: ICD-10-CM

## 2023-01-30 DIAGNOSIS — O24.414 INSULIN CONTROLLED GESTATIONAL DIABETES MELLITUS (GDM) IN THIRD TRIMESTER: ICD-10-CM

## 2023-01-30 PROCEDURE — 76819 FETAL BIOPHYS PROFIL W/O NST: CPT | Performed by: RADIOLOGY

## 2023-02-02 ENCOUNTER — MYC MEDICAL ADVICE (OUTPATIENT)
Dept: FAMILY MEDICINE | Facility: CLINIC | Age: 33
End: 2023-02-02
Payer: COMMERCIAL

## 2023-02-03 ENCOUNTER — OFFICE VISIT (OUTPATIENT)
Dept: OBGYN | Facility: CLINIC | Age: 33
End: 2023-02-03
Attending: ADVANCED PRACTICE MIDWIFE
Payer: COMMERCIAL

## 2023-02-03 ENCOUNTER — HOSPITAL ENCOUNTER (OUTPATIENT)
Dept: ULTRASOUND IMAGING | Facility: CLINIC | Age: 33
Discharge: HOME | End: 2023-02-03
Attending: REGISTERED NURSE
Payer: COMMERCIAL

## 2023-02-03 ENCOUNTER — HOSPITAL ENCOUNTER (OUTPATIENT)
Facility: CLINIC | Age: 33
Discharge: HOME | End: 2023-02-03
Attending: ADVANCED PRACTICE MIDWIFE | Admitting: ADVANCED PRACTICE MIDWIFE
Payer: COMMERCIAL

## 2023-02-03 VITALS
HEIGHT: 63 IN | DIASTOLIC BLOOD PRESSURE: 82 MMHG | WEIGHT: 159.3 LBS | SYSTOLIC BLOOD PRESSURE: 115 MMHG | HEART RATE: 71 BPM | BODY MASS INDEX: 28.23 KG/M2

## 2023-02-03 VITALS — RESPIRATION RATE: 16 BRPM | SYSTOLIC BLOOD PRESSURE: 125 MMHG | DIASTOLIC BLOOD PRESSURE: 85 MMHG | TEMPERATURE: 98.1 F

## 2023-02-03 DIAGNOSIS — O24.414 GESTATIONAL DIABETES MELLITUS (GDM) REQUIRING INSULIN: ICD-10-CM

## 2023-02-03 DIAGNOSIS — O24.414 INSULIN CONTROLLED GESTATIONAL DIABETES MELLITUS (GDM) IN THIRD TRIMESTER: ICD-10-CM

## 2023-02-03 DIAGNOSIS — O09.90 HIGH-RISK PREGNANCY, UNSPECIFIED TRIMESTER: Primary | ICD-10-CM

## 2023-02-03 DIAGNOSIS — Z34.02 ENCOUNTER FOR SUPERVISION OF NORMAL FIRST PREGNANCY IN SECOND TRIMESTER: ICD-10-CM

## 2023-02-03 LAB
BASOPHILS # BLD AUTO: 0 10E3/UL (ref 0–0.2)
BASOPHILS NFR BLD AUTO: 1 %
EOSINOPHIL # BLD AUTO: 0.1 10E3/UL (ref 0–0.7)
EOSINOPHIL NFR BLD AUTO: 1 %
ERYTHROCYTE [DISTWIDTH] IN BLOOD BY AUTOMATED COUNT: 13.3 % (ref 10–15)
GLUCOSE BLDC GLUCOMTR-MCNC: 83 MG/DL (ref 70–99)
HCT VFR BLD AUTO: 39.9 % (ref 35–47)
HGB BLD-MCNC: 13.7 G/DL (ref 11.7–15.7)
IMM GRANULOCYTES # BLD: 0 10E3/UL
IMM GRANULOCYTES NFR BLD: 1 %
LYMPHOCYTES # BLD AUTO: 2.2 10E3/UL (ref 0.8–5.3)
LYMPHOCYTES NFR BLD AUTO: 27 %
MCH RBC QN AUTO: 29.5 PG (ref 26.5–33)
MCHC RBC AUTO-ENTMCNC: 34.3 G/DL (ref 31.5–36.5)
MCV RBC AUTO: 86 FL (ref 78–100)
MONOCYTES # BLD AUTO: 0.5 10E3/UL (ref 0–1.3)
MONOCYTES NFR BLD AUTO: 6 %
NEUTROPHILS # BLD AUTO: 5.4 10E3/UL (ref 1.6–8.3)
NEUTROPHILS NFR BLD AUTO: 64 %
NRBC # BLD AUTO: 0 10E3/UL
NRBC BLD AUTO-RTO: 0 /100
PLATELET # BLD AUTO: 244 10E3/UL (ref 150–450)
RBC # BLD AUTO: 4.64 10E6/UL (ref 3.8–5.2)
WBC # BLD AUTO: 8.2 10E3/UL (ref 4–11)

## 2023-02-03 PROCEDURE — G0463 HOSPITAL OUTPT CLINIC VISIT: HCPCS

## 2023-02-03 PROCEDURE — 36415 COLL VENOUS BLD VENIPUNCTURE: CPT | Performed by: ADVANCED PRACTICE MIDWIFE

## 2023-02-03 PROCEDURE — 76819 FETAL BIOPHYS PROFIL W/O NST: CPT | Mod: 26 | Performed by: RADIOLOGY

## 2023-02-03 PROCEDURE — 59025 FETAL NON-STRESS TEST: CPT | Mod: 59 | Performed by: ADVANCED PRACTICE MIDWIFE

## 2023-02-03 PROCEDURE — 82962 GLUCOSE BLOOD TEST: CPT

## 2023-02-03 PROCEDURE — G0463 HOSPITAL OUTPT CLINIC VISIT: HCPCS | Mod: 25 | Performed by: ADVANCED PRACTICE MIDWIFE

## 2023-02-03 PROCEDURE — 85014 HEMATOCRIT: CPT | Performed by: ADVANCED PRACTICE MIDWIFE

## 2023-02-03 PROCEDURE — 99214 OFFICE O/P EST MOD 30 MIN: CPT | Mod: 25 | Performed by: ADVANCED PRACTICE MIDWIFE

## 2023-02-03 PROCEDURE — 76819 FETAL BIOPHYS PROFIL W/O NST: CPT

## 2023-02-03 PROCEDURE — 59025 FETAL NON-STRESS TEST: CPT | Mod: 26 | Performed by: ADVANCED PRACTICE MIDWIFE

## 2023-02-03 PROCEDURE — 99207 PR PRENATAL VISIT: CPT | Performed by: ADVANCED PRACTICE MIDWIFE

## 2023-02-03 RX ORDER — METOCLOPRAMIDE 10 MG/1
10 TABLET ORAL EVERY 6 HOURS PRN
Status: DISCONTINUED | OUTPATIENT
Start: 2023-02-03 | End: 2023-02-03 | Stop reason: HOSPADM

## 2023-02-03 RX ORDER — ONDANSETRON 2 MG/ML
4 INJECTION INTRAMUSCULAR; INTRAVENOUS EVERY 6 HOURS PRN
Status: DISCONTINUED | OUTPATIENT
Start: 2023-02-03 | End: 2023-02-03 | Stop reason: HOSPADM

## 2023-02-03 RX ORDER — METOCLOPRAMIDE HYDROCHLORIDE 5 MG/ML
10 INJECTION INTRAMUSCULAR; INTRAVENOUS EVERY 6 HOURS PRN
Status: DISCONTINUED | OUTPATIENT
Start: 2023-02-03 | End: 2023-02-03 | Stop reason: HOSPADM

## 2023-02-03 RX ORDER — PROCHLORPERAZINE MALEATE 10 MG
10 TABLET ORAL EVERY 6 HOURS PRN
Status: DISCONTINUED | OUTPATIENT
Start: 2023-02-03 | End: 2023-02-03 | Stop reason: HOSPADM

## 2023-02-03 RX ORDER — PROCHLORPERAZINE 25 MG
25 SUPPOSITORY, RECTAL RECTAL EVERY 12 HOURS PRN
Status: DISCONTINUED | OUTPATIENT
Start: 2023-02-03 | End: 2023-02-03 | Stop reason: HOSPADM

## 2023-02-03 RX ORDER — ONDANSETRON 4 MG/1
4 TABLET, ORALLY DISINTEGRATING ORAL EVERY 6 HOURS PRN
Status: DISCONTINUED | OUTPATIENT
Start: 2023-02-03 | End: 2023-02-03 | Stop reason: HOSPADM

## 2023-02-03 RX ORDER — LANCETS 33 GAUGE
EACH MISCELLANEOUS
Status: ON HOLD | COMMUNITY
Start: 2023-01-19 | End: 2023-02-15

## 2023-02-03 RX ORDER — BLOOD-GLUCOSE METER
EACH MISCELLANEOUS
COMMUNITY
Start: 2022-11-22 | End: 2023-03-31

## 2023-02-03 ASSESSMENT — ACTIVITIES OF DAILY LIVING (ADL): ADLS_ACUITY_SCORE: 31

## 2023-02-03 NOTE — PROGRESS NOTES
Data: [facility] at 1048.   Reason for maternal/fetal assessment per patient is BPP in clinic . Patient is a . Prenatal record reviewed.      OB History    Para Term  AB Living   1 0 0 0 0 0   SAB IAB Ectopic Multiple Live Births   0 0 0 0 0      # Outcome Date GA Lbr Joss/2nd Weight Sex Delivery Anes PTL Lv   1 Current               Medical History:   Past Medical History:   Diagnosis Date     Gestational diabetes      Varicose veins of lower extremity    . Gestational Age 38w1d. VSS. Cervix: closed.  Fetal movement present. Patient denies cramping, backache, vaginal discharge, pelvic pressure, UTI symptoms, GI problems, bloody show, vaginal bleeding, edema, headache, visual disturbances, epigastric or RUQ pain, abdominal pain, rupture of membranes. Support persons Jaxson present.  Action: Verbal consent for EFM. Triage assessment completed. EFM and toco applied, see flowsheet. Fetal assessment: Presumed adequate fetal oxygenation documented (see flow record).  Patient instructed to report change in fetal movement, vaginal leaking of fluid or bleeding, abdominal pain, or any concerns related to the pregnancy to her nurse/physician.   Response: Maria Alejandra Pappas CNM informed of arrival. Plan per provider is monitor 1 hour, collect CBC. Patient verbalized understanding of education and verbalized agreement with plan. Discharged ambulatory at 1247.

## 2023-02-03 NOTE — PROGRESS NOTES
"Subjective:     33 year old  at 38w1d presents for routine prenatal visit.           Patient concerns: Feeling well overall. Had BPP today- 6/8 (-2 breathing movements).  Pregnancy c/b GDMA2 - on insulin. States that her blood sugars have been WNL.    Feels contractions. Nonpainful. Denies vaginal bleeding, discharge or leakage of fluid. Reports +fetal movement.  No HA, vision changes, ruq/epigastric pain.        Objective:  Vitals:    23 0949   BP: 115/82   Pulse: 71   Weight: 72.3 kg (159 lb 4.8 oz)   Height: 1.6 m (5' 3\")        See OB flowsheet    NST: non reactive  Baseline 130s, periods of minimal variability, accel x 1, no decels  Contractions q2-4 minutes.     Assessment/Plan     Encounter Diagnoses   Name Primary?     High-risk pregnancy, unspecified trimester Yes     Gestational diabetes mellitus (GDM) requiring insulin      Orders Placed This Encounter   Procedures     NST, Single (In Clinic)     Orders Placed This Encounter   Medications     Lancets (ONETOUCH DELICA PLUS [id number]) MISC     blood glucose monitoring (ONE TOUCH ULTRA 2) meter device kit       - Reviewed why/how to contact provider if headache/visual changes/RUQ or epigastric pain, decreased fetal movement, vaginal bleeding, leakage of fluid or strong/regular contractions.    - Recommended extended monitoring at on BP.  Plan to evaluate delivery timing as well.    Charge RN and CNM notified.    FIDENCIO Rios CNM    "

## 2023-02-03 NOTE — PROGRESS NOTES
"Birthplace Triage NOTE  =================  38w1d    Elham Agarwal is a 33 year old female with an Patient's last menstrual period was 2022 (exact date). and Estimated Date of Delivery: 2023 is admitted to the Birthplace on 2/3/2023 at 12:18 PM for extended monitoring.     HPI  ================  Elham was seen in the clinic this am for a routine prenatal visit. She had a BPP due to her diagnosis of GDMA2. BPP was 6/8, 2 points off for fetal breathing movements. Her NST had periods of minimal variability, accelerations present.   Of note Elham presented to the [facility] 23 with a similar scenario and she had Category 1 fetal monitoring during her triage visit and was discharged  Elham was diagnosed with GDM 23. She attempted to control her blood sugars with diet and exercise, but needed to start insulin 22 to get blood sugars under control. She is now on 12 units of Levemir. As of 23 >90% of her blood sugars have been within the normal range. Elham is getting BPPs twice a week and seeing a midwife once a week.     She would like to avoid an induction if possible, but is open to this if it is clinically indicated.    Elham has not eaten anything since 7:30 this am because she did not think her appointment would take this long. Accepts a yogurt while in triage.   Elham is noticing mild contractions every 2-3 minutes    Denies fever, cough, SOB or chest pain. Denies having contact with anyone who is Covid-19 positive. Pt has had Covid-19 Vaccinations.  Agreeable to Covid-19 testing  Contractions- every 2-3 minutes  Fetal movement- active  ROM- no.  Vaginal bleeding- none  GBS- negative  FOB- is involved, Jaxson      Weight gain- 159 - 148 lbs, Total weight gain- 11 lbs  Height- 5'3\"  BMI- 26  First prenatal visit at 10/4 weeks, Total visits- 10    PROBLEM LIST  =================  Patient Active Problem List    Diagnosis Date Noted     Gestational diabetes mellitus (GDM) " requiring insulin 11/22/2022     Priority: Medium     12/9/22: poorly controlled so far, consult with Vadim after visit today to discuss need for insulin. BPPs and growth US ordered for presumed GDMA2  12/23: increased insulin w Vadim consult.  BPPs 2x week and growth q 3-4 wks, started discussion re IOL between 39-39.6       Vitamin D deficiency 09/20/2022     Priority: Medium     Vit d = 27, started supplement  12/9: vit d most recent 39       Dermoid cyst 08/08/2022     Priority: Medium     8/5/22- MFM US- rt dermoid cyst [id number]  10/7- MFM US rt cyst still present, recommend re-imaging postpartum       High-risk pregnancy, unspecified trimester 08/08/2022     Priority: Medium     [facility] CNM pt  Partner's name: Jaxson  [ ] NOB folder  [ ] Dating  [x ] First tri screen completed  [ ] QS/AFP ordered declined  [x ] Fetal anatomy US ordered  [x ] Rubella immune  [ ] Hep B immune/nonimmune  [x ] Pap completed 2021 in [place]  [ ] Started ASA   [ ] NO YES plan utox in labor   [ ] COVID vaccine completed  _____________________________________  [x ] EOB folder  [x ] PP Contraception plan: condoms   [x ] Labor plans: hopes for un-medicated but may want epidural,  to come for labor  [na ] :  [x ] Infant feeding plan: breast   [X ] FLU shot  [x ] TDAP given  [NA] Rhogam if needed, date:  [ NA] TOLAC consent done  [NA ] Waterbirth declines, consent done  [x ] GCT, failed, GDM  ________________________________________  [ ] OTC PP meds sent  [ ] PP plans, time off, support system discussed, resources offered  [ ] Planning CS-ERAS pkt8/5/22- MFM US- first trimester screening wnl, NIPT____, AFP only at 15 weeks____         HISTORIES  ============  Allergies   Allergen Reactions     Misc. Sulfonamide Containing Compounds Dermatitis and Rash     Penicillins Dermatitis, Itching and Rash     Past Medical History:   Diagnosis Date     Gestational diabetes 2022     Varicose veins of lower extremity 2008     History  reviewed. No pertinent surgical history..  Family History   Problem Relation Age of Onset     Anxiety Disorder Mother      Prostate Cancer Maternal Grandfather      Other Cancer Maternal Grandfather      Hypertension Maternal Grandmother      Depression Maternal Grandmother      Diabetes Paternal Grandfather      Hypertension Paternal Grandmother      Social History     Tobacco Use     Smoking status: Never     Smokeless tobacco: Never   Substance Use Topics     Alcohol use: Not Currently     OB History    Para Term  AB Living   1 0 0 0 0 0   SAB IAB Ectopic Multiple Live Births   0 0 0 0 0      # Outcome Date GA Lbr Joss/2nd Weight Sex Delivery Anes PTL Lv   1 Current                 LABS:   ===========  Prenatal Labs:  Rhogam not indicated   Lab Results   Component Value Date    AS Negative 2022    RUQIGG Positive 2022    HEPBANG Nonreactive 2022    HGB 12.1 2022    HIAGAB Nonreactive 2022    GLU1 188 (H) 2022     Rubella immune   GBS Negative  Other labs:  COVID-19 PCR Results    COVID-19 PCR Results   No data to display.         COVID-19 Antibody Results, Testing for Immunity    COVID-19 Antibody Results, Testing for Immunity   No data to display.            Results for orders placed or performed during the hospital encounter of 23 (from the past 24 hour(s))   Glucose by meter   Result Value Ref Range    GLUCOSE BY METER POCT 83 70 - 99 mg/dL       ROS  =========  Pt denies significant respiratory, cardiovacular, GI, or muscular/skeletalcomplaints.    See RN data base ROS.       PHYSICAL EXAM:  ===============  /85 (BP Location: Left arm, Patient Position: Semi-Soto's, Cuff Size: Adult Regular)   Temp 98.1  F (36.7  C) (Oral)   Resp 16   LMP 2022 (Exact Date)   General appearance: comfortable, notes near fundus  GENERAL APPEARANCE: healthy, alert and no distress  RESP: lungs clear to auscultation - no rales, rhonchi or wheezes  CV: regular  rates and rhythm, normal S1 S2, no S3 or S4 and no murmur,and no varicosities  ABDOMEN:  soft, nontender, no epigastric pain  SKIN: no suspicious lesions or rashes  NEURO: Denies headache, blurred vision, other vision changes  PSYCH: mentation appears normal. and affect normal/bright  Legs: Reflexes normal bilaterally     Abdomen: gravid, vertex fetus per Leopold's, non-tender between contractions.   Cephalic presentation confirmed by BSUS  EFW-  6.5 lbs.   CONTRACTIONS: every 2-3 minutes  FETAL HEART TONES: continuous EFM- baseline 130 with moderate variability and positive accelerations. No decelerations x 1 hour  PELVIC EXAM: ext: 0.5/Internal closed/ 30%/ Posterior/ firm/ -3   ESCOBAR SCORE: 0  BLOODY SHOW: no   ROM:no  FLUID: none  ROMPLUS: not done    # Pain Assessment:  Current Pain Score 2/3/2023   Patient currently in pain? frank Lizarraga s pain level was assessed and she currently denies pain.        ASSESSMENT:  ==============  IUP @ 38w1d admitted for prolonged fetal monitoring  GDMA2, on 12 units of Levemir  Contraction stress test reactive  Fetal Heart Tones - category one x 1 hour  GBS- negative      Patient Active Problem List   Diagnosis     Dermoid cyst     High-risk pregnancy, unspecified trimester     Vitamin D deficiency     Gestational diabetes mellitus (GDM) requiring insulin       PLAN:  ===========  -Reviewed reactive contraction stress test with Elham. Given reactive NST, she now has a BPP of 8/10. Reviewed importance of assessing fetal movement and calling with any concerns.   -Discussed recommendations for timing of delivery with GDMA2 - If blood sugars are in good control would discuss IOL at 40 or 41 weeks. Reviewed increased risks the further she gets in her pregnancy  -Hortencia every 2-3 minutes - discussed these could be practice contractions or they could be early labor. Encouraged rest, hydration and small frequent foods today. Reviewed when to call in labor  -If contractions do  not progress, keep next scheduled appointments.   -Reviewed warning signs  -Complete CBC prior to discharge as she missed this lab at her last visit.   Maria Alejandra Pappas CNM

## 2023-02-03 NOTE — PATIENT INSTRUCTIONS
Thank you for trusting us with your care!     If you need to contact us for questions about:  Symptoms, Scheduling & Medical Questions; Non-urgent (2-3 day response) Maribelbin message, Urgent (needing response today) [phone] (if after 3:30pm next day response)   Prescriptions: Please call your Pharmacy   Billing: Danish [phone] or [facility]:[phone]           Labor Induction  What You Need to Know  What is labor induction?  In most cases, it is best to go into labor naturally. When labor does not start on its own, we may use medicine or other methods to start (induce) labor.   This is called an induction of labor. It helps the uterus to contract. It also helps to thin, soften and open the cervix. (The cervix is the opening of the uterus.)  When is induction used?  There are two types of induction:  Medical induction is done to protect the health of the mom or baby. We may start labor if:  There are medical concerns for you or your baby.  You haven't had your baby by 41 weeks.  Elective induction is done for non-medical reasons. This may be done if:  You live far from the hospital.  You have been having contractions for many days.  You have given birth quickly in the past.  We will not perform an elective induction before 39 weeks. Studies show that babies born before 39 weeks may struggle with breathing, feeding, sleeping and staying warm. They are more likely to have health problems. They may need to stay in the hospital longer.  If we start your labor for medical reasons, the benefits will outweigh these risks.   We will talk to you about your risks, benefits and alternatives (other options) before we start your labor.  What might happen if my labor is induced?  Some of this depends on how your labor is started and how your body responds. Your labor may be more complicated. You and your baby may need more medical treatments. In general:  You may not go into labor right away. If so, we may send  you home with follow-up instructions.  You may not be able to move around during labor. You will have two belts with monitors attached to your belly. These allow the nurse to watch your contractions and your baby's heart rate.  Your labor may be longer and more painful. It might take more than one day.  A long labor may increase the risk of infection in mother and baby.  Your labor may not progress as planned. This could lead to a  birth.  How is induction done?  We may start your labor by doing one or more of the following:  We may place medicine in your vagina, near your cervix. This can help to open and prepare the cervix for labor. It is only used when there are medical reasons to start labor.  After your cervix is ready:  We may give you medicine through an IV (a small tube placed in your vein). This medicine is called pitocin. It helps the muscles of your uterus to contract.  We may make a small opening in your bag of water (the sac around your baby). This is called an amniotomy. It may help your uterus contract and your cervix open.  Can I plan the date of my delivery?  After 39 weeks, you may ask about planning the date of your delivery. This is only an option if your body--and your baby--are ready.   We will check your cervix and your baby's heart rate.   If you are ready to be induced, we will give you a date and time to come to the hospital. If many mothers are in labor that day, we may need to start your labor at another time.  If you are not ready, we will not start your labor. It will be safer for your baby to come on its own.  What else do I need to know?  Before you have an induction, make sure you understand the reasons, risks and benefits. Ask your doctor or nurse-midwife:  Why do I need to be induced?  What are the risks to my baby?  How will you start my labor?  How will you know if my baby is ready to be born?  How will you know if my body is ready to give birth?  Where can I get more  information?  To learn more about induction, you may visit these websites:  The American College of Nurse-Midwives: www.mymidwife.org  The American College of Obstetricians and Gynecologists: www.acog.org  Childbirth Connection: www.childbirthconnection.org  March of Dimes: www.marchofdimes.com  For informational purposes only. Not to replace the advice of your health care provider.   Copyright   2008 [employer]. All rights reserved. Clinically reviewed by the  System Operations Leadership team. [employer] 255883 - REV .  For informational purposes only. Not to replace the advice of your health care provider.  Copyright   2018 [employer]. All rights reserved.

## 2023-02-03 NOTE — PROVIDER NOTIFICATION
02/03/23 1104   Provider Notification   Provider Name/Title Maria Alejandra Pappas CNM   Method of Notification Electronic Page   Request Evaluate in Person   Notification Reason Patient Arrived     Pt arrived from clinic with BPP 6/8. 38.1, GDM. VSS. EFM placed.

## 2023-02-03 NOTE — LETTER
Date:February 6, 2023      Provider requested that no letter be sent. Do not send.       [facility]

## 2023-02-03 NOTE — PROGRESS NOTES
at 38 weeks.  NST completed with EFM, read by Antolin Ngo.  Baseline 130.  Minimal variability.  Accels present.  Decels not present.  Hortencia every 2-4 minutes.  Patient denies pain, but able to feel contractions.    Per Antolin, plan for evaluation in the birthplace.

## 2023-02-03 NOTE — DISCHARGE INSTRUCTIONS
Discharge Instruction for Undelivered Patients      You were seen for: Fetal Assessment  We Consulted: Maria Alejandra Pappas CNM   You had (Test or Medicine): fetal monitoring and CBC.      Diet:   To manage your diabetes, follow the guidelines for eating and drinking given to you by your Clinic Provider or Diabetes Educator.       Activity:  Call your doctor or nurse midwife if your baby is moving less than usual.     Call your provider if you notice:  Swelling in your face or increased swelling in your hands or legs.  Headaches that are not relieved by Tylenol (acetaminophen).  Changes in your vision (blurring: seeing spots or stars.)  Nausea (sick to your stomach) and vomiting (throwing up).   Weight gain of 5 pounds or more per week.  Heartburn that doesn't go away.  Signs of bladder infection: pain when you urinate (use the toilet), need to go more often and more urgently.  The bag of cedeno (rupture of membranes) breaks, or you notice leaking in your underwear.  Bright red blood in your underwear.  Abdominal (lower belly) or stomach pain.  For first baby: Contractions (tightening) less than 5 minutes apart for one hour or more.  Second (plus) baby: Contractions (tightening) less than 10 minutes apart and getting stronger.  *If less than 34 weeks: Contractions (tightening) more than 6 times in one hour.  Increase or change in vaginal discharge (note the color and amount)    Follow-up:  As scheduled in the clinic

## 2023-02-03 NOTE — LETTER
"2/3/2023       RE: Elham Agarwal  [address]  [facility] [place] [facility] [address]     Dear Colleague,    Thank you for referring your patient, Elham Agarwal, to the [facility] [place] at [facility]. Please see a copy of my visit note below.    Subjective:     33 year old  at 38w1d presents for routine prenatal visit.           Patient concerns: Feeling well overall. Had BPP today- 6/8 (-2 breathing movements).  Pregnancy c/b GDMA2 - on insulin. States that her blood sugars have been WNL.    Feels contractions. Nonpainful. Denies vaginal bleeding, discharge or leakage of fluid. Reports +fetal movement.  No HA, vision changes, ruq/epigastric pain.        Objective:  Vitals:    23 0949   BP: 115/82   Pulse: 71   Weight: 72.3 kg (159 lb 4.8 oz)   Height: 1.6 m (5' 3\")        See OB flowsheet    NST: non reactive  Baseline 130s, periods of minimal variability, accel x 1, no decels  Contractions q2-4 minutes.     Assessment/Plan     Encounter Diagnoses   Name Primary?     High-risk pregnancy, unspecified trimester Yes     Gestational diabetes mellitus (GDM) requiring insulin      Orders Placed This Encounter   Procedures     NST, Single (In Clinic)     Orders Placed This Encounter   Medications     Lancets (ONETOUCH DELICA PLUS [id number]) MISC     blood glucose monitoring (ONE TOUCH ULTRA 2) meter device kit       - Reviewed why/how to contact provider if headache/visual changes/RUQ or epigastric pain, decreased fetal movement, vaginal bleeding, leakage of fluid or strong/regular contractions.    - Recommended extended monitoring at on BP.  Plan to evaluate delivery timing as well.    Charge RN and CNM notified.    FIDENCIO Rios, ESCOBAR       at 38 weeks.  NST completed with EFM, read by Antolin Ngo.  Baseline 130.  Minimal variability.  Accels present.  Decels not present.  Hortencia every 2-4 minutes.  " Patient denies pain, but able to feel contractions.    Per Antolin, plan for evaluation in the birthplace.           Again, thank you for allowing me to participate in the care of your patient.      Sincerely,    Antolin Ngo CNM

## 2023-02-06 ENCOUNTER — HOSPITAL ENCOUNTER (OUTPATIENT)
Dept: ULTRASOUND IMAGING | Facility: CLINIC | Age: 33
Discharge: HOME | End: 2023-02-06
Attending: ADVANCED PRACTICE MIDWIFE | Admitting: ADVANCED PRACTICE MIDWIFE
Payer: COMMERCIAL

## 2023-02-06 PROCEDURE — 76819 FETAL BIOPHYS PROFIL W/O NST: CPT | Mod: 26 | Performed by: RADIOLOGY

## 2023-02-06 PROCEDURE — 76819 FETAL BIOPHYS PROFIL W/O NST: CPT

## 2023-02-09 ENCOUNTER — VIRTUAL VISIT (OUTPATIENT)
Dept: PHARMACY | Facility: CLINIC | Age: 33
End: 2023-02-09
Payer: COMMERCIAL

## 2023-02-09 ENCOUNTER — TELEPHONE (OUTPATIENT)
Dept: OBGYN | Facility: CLINIC | Age: 33
End: 2023-02-09
Payer: COMMERCIAL

## 2023-02-09 DIAGNOSIS — O09.90 HIGH-RISK PREGNANCY, UNSPECIFIED TRIMESTER: ICD-10-CM

## 2023-02-09 DIAGNOSIS — O24.414 GESTATIONAL DIABETES MELLITUS (GDM) REQUIRING INSULIN: Primary | ICD-10-CM

## 2023-02-09 PROCEDURE — 99207 PR NO CHARGE LOS: CPT | Performed by: PHARMACIST

## 2023-02-09 NOTE — TELEPHONE ENCOUNTER
Received staff message from Alison Brooks- patient was seen by her today regarding GDM, and Alison noticed patient was scheduled for US tomorrow, but no clinic visit.  Plan is to induce 2/16.  Called and scheduled patient for KEIRY tomorrow, 2/10.

## 2023-02-09 NOTE — Clinical Note
Just want to double check on her fasting glucose - they are 5-10pts lower than previous and I'm remembering something about effect of placental breakdown and lower glucose towards the end of pregnancy? I kept her Levemir at 12 units - is that OK for her? She has OB appt/US tomorrow. Thank you!

## 2023-02-09 NOTE — PATIENT INSTRUCTIONS
"Recommendations from today's MTM visit:                                                         Continue insulin at 12 units daily    RNs will call to help you with a follow-up appointment    Follow-up: as needed    It was great speaking with you today.  I value your experience and would be very thankful for your time in providing feedback in our clinic survey. In the next few days, you may receive an email or text message from [facility] with a link to a survey related to your  clinical pharmacist.\"     To schedule another MTM appointment, please call the clinic directly or you may call the MTM scheduling line at [phone] or toll-free at [phone].     My Clinical Pharmacist's contact information:                                                      Please feel free to contact me with any questions or concerns you have.      Alison Brooks, PharmD, BannerCP   Medication Management Pharmacist   [facility]  [phone]    "

## 2023-02-09 NOTE — PROGRESS NOTES
Clinical Pharmacy Consult:                                                    Elham Agarwal is a 33 year old female called for a clinical pharmacist consult.  She was referred to me from [facility]. Called with      Reason for Consult: GDM    Discussion:   GDM: Elham Agarwal is a 33 year old  at 39w0d for follow-up of gestational diabetes for insulin adjustment.  Current therapy:   Long-Acting Insulin, 12 units at night    She is checking her blood sugars regularly, including Fasting and 1-hour post-prandial.    Blood sugars are as follows:     Date Fasting Post-Breakfast Post-Lunch Post-Dinner   2/3 76 137 108 110   2/ 81 105 110 112   / 80 101 114 108   2/6 78 131 90 122    83 129 94 92   8 68 112 106 126    78          Pregnancy: feeling well. She is taking prenatal vitamin without problems. Since glucose has been well controlled, she would like to wait for induction until next week when she will be 40 weeks ()      Plan:  1. Continue insulin at current dose  2. Routing to RN to please outreach to patient for KEIRY    No follow-up GDM visits planned with upcoming induction.     Alison Brooks, PharmD, BCACP   Medication Management Pharmacist   [facility]  [phone]      Medication Therapy Recommendations  No medication therapy recommendations to display

## 2023-02-10 ENCOUNTER — HOSPITAL ENCOUNTER (OUTPATIENT)
Dept: ULTRASOUND IMAGING | Facility: CLINIC | Age: 33
Discharge: HOME | End: 2023-02-10
Attending: REGISTERED NURSE
Payer: COMMERCIAL

## 2023-02-10 ENCOUNTER — OFFICE VISIT (OUTPATIENT)
Dept: OBGYN | Facility: CLINIC | Age: 33
End: 2023-02-10
Attending: ADVANCED PRACTICE MIDWIFE
Payer: COMMERCIAL

## 2023-02-10 VITALS
SYSTOLIC BLOOD PRESSURE: 116 MMHG | BODY MASS INDEX: 27.46 KG/M2 | WEIGHT: 155 LBS | HEART RATE: 73 BPM | DIASTOLIC BLOOD PRESSURE: 80 MMHG

## 2023-02-10 DIAGNOSIS — O24.414 INSULIN CONTROLLED GESTATIONAL DIABETES MELLITUS (GDM) IN THIRD TRIMESTER: ICD-10-CM

## 2023-02-10 DIAGNOSIS — Z34.02 ENCOUNTER FOR SUPERVISION OF NORMAL FIRST PREGNANCY IN SECOND TRIMESTER: ICD-10-CM

## 2023-02-10 DIAGNOSIS — O09.93 HRP (HIGH RISK PREGNANCY), THIRD TRIMESTER: Primary | ICD-10-CM

## 2023-02-10 DIAGNOSIS — O24.414 GESTATIONAL DIABETES MELLITUS (GDM) REQUIRING INSULIN: ICD-10-CM

## 2023-02-10 PROCEDURE — 76819 FETAL BIOPHYS PROFIL W/O NST: CPT | Mod: 26 | Performed by: RADIOLOGY

## 2023-02-10 PROCEDURE — 76819 FETAL BIOPHYS PROFIL W/O NST: CPT

## 2023-02-10 PROCEDURE — 99207 PR PRENATAL VISIT: CPT | Performed by: ADVANCED PRACTICE MIDWIFE

## 2023-02-10 PROCEDURE — G0463 HOSPITAL OUTPT CLINIC VISIT: HCPCS | Mod: 25 | Performed by: ADVANCED PRACTICE MIDWIFE

## 2023-02-10 NOTE — LETTER
Date:February 10, 2023      Provider requested that no letter be sent. Do not send.       [facility]

## 2023-02-10 NOTE — LETTER
2/10/2023       RE: Elham Agarwal  [address]  [place] [address]     Dear Colleague,    Thank you for referring your patient, Elham Agarwal, to the [facility] [place] at [facility]. Please see a copy of my visit note below.    Subjective:     33 year old  at 39w1d presents for routine prenatal visit.            Denies vaginal bleeding or leakage of fluid.  Reports irregular contractions.  Reports regular fetal movement.        No HA, visual changes, RUQ or epigastric pain.   Patient concerns: Continues QID blood glucose monitoring.  All readings within goal while using 12 units insulin at bedtime.  Feeling well overall.  Has BPP scheduled later today.  Open to scheduling BPP early next week and plans induction on 23 (which was scheduled today).  Objective:  Vitals:    02/10/23 0905   BP: 116/80   Pulse: 73   Weight: 70.3 kg (155 lb)    See OB flowsheet  Assessment/Plan     Encounter Diagnoses   Name Primary?     Gestational diabetes mellitus (GDM) requiring insulin      HRP (high risk pregnancy), third trimester Yes     Orders Placed This Encounter   Procedures     US OB Fetal Biophys Prf wo NonStrs Singls Sgl     No orders of the defined types were placed in this encounter.    - Reviewed why/how to contact provider if headache/visual changes/RUQ or epigastric pain, decreased fetal movement, vaginal bleeding, leakage of fluid or strong/regular contractions.   Patient education/orders or handouts today:  Sign/symptoms of labor, When to call for labor or other concerns and BPP  Return to clinic in 1 week and prn if questions or concerns.   FIDENCIO Nieto CNM        Again, thank you for allowing me to participate in the care of your patient.      Sincerely,    FIDENCIO Nieto CNM

## 2023-02-10 NOTE — PROGRESS NOTES
Subjective:     33 year old  at 39w1d presents for routine prenatal visit.            Denies vaginal bleeding or leakage of fluid.  Reports irregular contractions.  Reports regular fetal movement.        No HA, visual changes, RUQ or epigastric pain.   Patient concerns: Continues QID blood glucose monitoring.  All readings within goal while using 12 units insulin at bedtime.  Feeling well overall.  Has BPP scheduled later today.  Open to scheduling BPP early next week and plans induction on 23 (which was scheduled today).  Objective:  Vitals:    02/10/23 0905   BP: 116/80   Pulse: 73   Weight: 70.3 kg (155 lb)    See OB flowsheet  Assessment/Plan     Encounter Diagnoses   Name Primary?     Gestational diabetes mellitus (GDM) requiring insulin      HRP (high risk pregnancy), third trimester Yes     Orders Placed This Encounter   Procedures     US OB Fetal Biophys Prf wo NonStrs Singls Sgl     No orders of the defined types were placed in this encounter.    - Reviewed why/how to contact provider if headache/visual changes/RUQ or epigastric pain, decreased fetal movement, vaginal bleeding, leakage of fluid or strong/regular contractions.   Patient education/orders or handouts today:  Sign/symptoms of labor, When to call for labor or other concerns and BPP  Return to clinic in 1 week and prn if questions or concerns.   FIDENCIO Nieto CNM

## 2023-02-10 NOTE — PATIENT INSTRUCTIONS
Labor Induction  What You Need to Know  What is labor induction?  In most cases, it is best to go into labor naturally. When labor does not start on its own, we may use medicine or other methods to start (induce) labor.   This is called an induction of labor. It helps the uterus to contract. It also helps to thin, soften and open the cervix. (The cervix is the opening of the uterus.)  When is induction used?  There are two types of induction:  Medical induction is done to protect the health of the mom or baby. We may start labor if:  There are medical concerns for you or your baby.  You haven't had your baby by 41 weeks.  Elective induction is done for non-medical reasons. This may be done if:  You live far from the hospital.  You have been having contractions for many days.  You have given birth quickly in the past.  We will not perform an elective induction before 39 weeks. Studies show that babies born before 39 weeks may struggle with breathing, feeding, sleeping and staying warm. They are more likely to have health problems. They may need to stay in the hospital longer.  If we start your labor for medical reasons, the benefits will outweigh these risks.   We will talk to you about your risks, benefits and alternatives (other options) before we start your labor.  What might happen if my labor is induced?  Some of this depends on how your labor is started and how your body responds. Your labor may be more complicated. You and your baby may need more medical treatments. In general:  You may not go into labor right away. If so, we may send you home with follow-up instructions.  You may not be able to move around during labor. You will have two belts with monitors attached to your belly. These allow the nurse to watch your contractions and your baby's heart rate.  Your labor may be longer and more painful. It might take more than one day.  A long labor may increase the risk of infection in mother and baby.  Your  labor may not progress as planned. This could lead to a  birth.  How is induction done?  We may start your labor by doing one or more of the following:  We may place medicine in your vagina, near your cervix. This can help to open and prepare the cervix for labor. It is only used when there are medical reasons to start labor.  After your cervix is ready:  We may give you medicine through an IV (a small tube placed in your vein). This medicine is called pitocin. It helps the muscles of your uterus to contract.  We may make a small opening in your bag of water (the sac around your baby). This is called an amniotomy. It may help your uterus contract and your cervix open.  Can I plan the date of my delivery?  After 39 weeks, you may ask about planning the date of your delivery. This is only an option if your body--and your baby--are ready.   We will check your cervix and your baby's heart rate.   If you are ready to be induced, we will give you a date and time to come to the hospital. If many mothers are in labor that day, we may need to start your labor at another time.  If you are not ready, we will not start your labor. It will be safer for your baby to come on its own.  What else do I need to know?  Before you have an induction, make sure you understand the reasons, risks and benefits. Ask your doctor or nurse-midwife:  Why do I need to be induced?  What are the risks to my baby?  How will you start my labor?  How will you know if my baby is ready to be born?  How will you know if my body is ready to give birth?  Where can I get more information?  To learn more about induction, you may visit these websites:  The American College of Nurse-Midwives: www.mymidwife.org  The American College of Obstetricians and Gynecologists: www.acog.org  Childbirth Connection: www.childbirthconnection.org  March of D[employer]: www.marchofdimes.Relaborate  For informational purposes only. Not to replace the advice of your health care  provider.   Copyright   2008 [employer]. All rights reserved. Clinically reviewed by the  System Operations Leadership team. [employer] [place] 506962 - REV .  For informational purposes only. Not to replace the advice of your health care provider.  Copyright   2018 [employer]. All rights reserved.    Induction of labor scheduled on  at 7:30 am.  Please call labor and delivery to check in around 6:30am [phone]

## 2023-02-13 ENCOUNTER — ANESTHESIA EVENT (OUTPATIENT)
Dept: OBGYN | Facility: CLINIC | Age: 33
End: 2023-02-13
Payer: COMMERCIAL

## 2023-02-13 ENCOUNTER — ANESTHESIA (OUTPATIENT)
Dept: OBGYN | Facility: CLINIC | Age: 33
End: 2023-02-13
Payer: COMMERCIAL

## 2023-02-13 ENCOUNTER — TELEPHONE (OUTPATIENT)
Dept: OBGYN | Facility: CLINIC | Age: 33
End: 2023-02-13
Payer: COMMERCIAL

## 2023-02-13 ENCOUNTER — HOSPITAL ENCOUNTER (INPATIENT)
Facility: CLINIC | Age: 33
LOS: 3 days | Discharge: HOME HEALTH | End: 2023-02-16
Attending: REGISTERED NURSE | Admitting: REGISTERED NURSE
Payer: COMMERCIAL

## 2023-02-13 DIAGNOSIS — O09.90 HIGH-RISK PREGNANCY, UNSPECIFIED TRIMESTER: ICD-10-CM

## 2023-02-13 DIAGNOSIS — O13.3 GESTATIONAL HYPERTENSION, THIRD TRIMESTER: ICD-10-CM

## 2023-02-13 DIAGNOSIS — O24.414 GESTATIONAL DIABETES MELLITUS (GDM) REQUIRING INSULIN: ICD-10-CM

## 2023-02-13 LAB
ABO/RH(D): NORMAL
ALT SERPL W P-5'-P-CCNC: 11 U/L (ref 10–35)
ANTIBODY SCREEN: NEGATIVE
AST SERPL W P-5'-P-CCNC: 19 U/L (ref 10–35)
CREAT SERPL-MCNC: 0.74 MG/DL (ref 0.51–0.95)
ERYTHROCYTE [DISTWIDTH] IN BLOOD BY AUTOMATED COUNT: 13.2 % (ref 10–15)
ERYTHROCYTE [DISTWIDTH] IN BLOOD BY AUTOMATED COUNT: 13.2 % (ref 10–15)
GFR SERPL CREATININE-BSD FRML MDRD: >90 ML/MIN/1.73M2
GLUCOSE BLDC GLUCOMTR-MCNC: 127 MG/DL (ref 70–99)
GLUCOSE BLDC GLUCOMTR-MCNC: 88 MG/DL (ref 70–99)
GLUCOSE BLDC GLUCOMTR-MCNC: 89 MG/DL (ref 70–99)
GLUCOSE BLDC GLUCOMTR-MCNC: 93 MG/DL (ref 70–99)
HBA1C MFR BLD: 5.4 %
HCT VFR BLD AUTO: 38.1 % (ref 35–47)
HCT VFR BLD AUTO: 40.4 % (ref 35–47)
HGB BLD-MCNC: 13.3 G/DL (ref 11.7–15.7)
HGB BLD-MCNC: 13.9 G/DL (ref 11.7–15.7)
MCH RBC QN AUTO: 29.4 PG (ref 26.5–33)
MCH RBC QN AUTO: 29.5 PG (ref 26.5–33)
MCHC RBC AUTO-ENTMCNC: 34.4 G/DL (ref 31.5–36.5)
MCHC RBC AUTO-ENTMCNC: 34.9 G/DL (ref 31.5–36.5)
MCV RBC AUTO: 85 FL (ref 78–100)
MCV RBC AUTO: 85 FL (ref 78–100)
PLATELET # BLD AUTO: 215 10E3/UL (ref 150–450)
PLATELET # BLD AUTO: 224 10E3/UL (ref 150–450)
RBC # BLD AUTO: 4.51 10E6/UL (ref 3.8–5.2)
RBC # BLD AUTO: 4.73 10E6/UL (ref 3.8–5.2)
SARS-COV-2 RNA RESP QL NAA+PROBE: NEGATIVE
SPECIMEN EXPIRATION DATE: NORMAL
T PALLIDUM AB SER QL: NONREACTIVE
WBC # BLD AUTO: 19 10E3/UL (ref 4–11)
WBC # BLD AUTO: 9.1 10E3/UL (ref 4–11)

## 2023-02-13 PROCEDURE — G0463 HOSPITAL OUTPT CLINIC VISIT: HCPCS

## 2023-02-13 PROCEDURE — 00HU33Z INSERTION OF INFUSION DEVICE INTO SPINAL CANAL, PERCUTANEOUS APPROACH: ICD-10-PCS | Performed by: STUDENT IN AN ORGANIZED HEALTH CARE EDUCATION/TRAINING PROGRAM

## 2023-02-13 PROCEDURE — 250N000012 HC RX MED GY IP 250 OP 636 PS 637: Performed by: REGISTERED NURSE

## 2023-02-13 PROCEDURE — 36415 COLL VENOUS BLD VENIPUNCTURE: CPT | Performed by: MIDWIFE

## 2023-02-13 PROCEDURE — 370N000003 HC ANESTHESIA WARD SERVICE

## 2023-02-13 PROCEDURE — 86901 BLOOD TYPING SEROLOGIC RH(D): CPT | Performed by: REGISTERED NURSE

## 2023-02-13 PROCEDURE — C9803 HOPD COVID-19 SPEC COLLECT: HCPCS

## 2023-02-13 PROCEDURE — 82565 ASSAY OF CREATININE: CPT | Performed by: MIDWIFE

## 2023-02-13 PROCEDURE — 86850 RBC ANTIBODY SCREEN: CPT | Performed by: REGISTERED NURSE

## 2023-02-13 PROCEDURE — 120N000002 HC R&B MED SURG/OB UMMC

## 2023-02-13 PROCEDURE — 258N000003 HC RX IP 258 OP 636: Performed by: MIDWIFE

## 2023-02-13 PROCEDURE — 250N000011 HC RX IP 250 OP 636

## 2023-02-13 PROCEDURE — 0UQGXZZ REPAIR VAGINA, EXTERNAL APPROACH: ICD-10-PCS | Performed by: MIDWIFE

## 2023-02-13 PROCEDURE — 0UQMXZZ REPAIR VULVA, EXTERNAL APPROACH: ICD-10-PCS | Performed by: MIDWIFE

## 2023-02-13 PROCEDURE — 258N000003 HC RX IP 258 OP 636

## 2023-02-13 PROCEDURE — 85027 COMPLETE CBC AUTOMATED: CPT | Performed by: MIDWIFE

## 2023-02-13 PROCEDURE — 36415 COLL VENOUS BLD VENIPUNCTURE: CPT | Performed by: REGISTERED NURSE

## 2023-02-13 PROCEDURE — 250N000011 HC RX IP 250 OP 636: Performed by: REGISTERED NURSE

## 2023-02-13 PROCEDURE — 250N000009 HC RX 250: Performed by: MIDWIFE

## 2023-02-13 PROCEDURE — 84460 ALANINE AMINO (ALT) (SGPT): CPT | Performed by: MIDWIFE

## 2023-02-13 PROCEDURE — 86780 TREPONEMA PALLIDUM: CPT | Performed by: REGISTERED NURSE

## 2023-02-13 PROCEDURE — 3E0R3BZ INTRODUCTION OF ANESTHETIC AGENT INTO SPINAL CANAL, PERCUTANEOUS APPROACH: ICD-10-PCS | Performed by: STUDENT IN AN ORGANIZED HEALTH CARE EDUCATION/TRAINING PROGRAM

## 2023-02-13 PROCEDURE — U0003 INFECTIOUS AGENT DETECTION BY NUCLEIC ACID (DNA OR RNA); SEVERE ACUTE RESPIRATORY SYNDROME CORONAVIRUS 2 (SARS-COV-2) (CORONAVIRUS DISEASE [COVID-19]), AMPLIFIED PROBE TECHNIQUE, MAKING USE OF HIGH THROUGHPUT TECHNOLOGIES AS DESCRIBED BY CMS-2020-01-R: HCPCS | Performed by: REGISTERED NURSE

## 2023-02-13 PROCEDURE — 84450 TRANSFERASE (AST) (SGOT): CPT | Performed by: MIDWIFE

## 2023-02-13 PROCEDURE — 83036 HEMOGLOBIN GLYCOSYLATED A1C: CPT | Performed by: REGISTERED NURSE

## 2023-02-13 PROCEDURE — 85027 COMPLETE CBC AUTOMATED: CPT | Performed by: REGISTERED NURSE

## 2023-02-13 RX ORDER — NALOXONE HYDROCHLORIDE 0.4 MG/ML
0.4 INJECTION, SOLUTION INTRAMUSCULAR; INTRAVENOUS; SUBCUTANEOUS
Status: DISCONTINUED | OUTPATIENT
Start: 2023-02-13 | End: 2023-02-14 | Stop reason: HOSPADM

## 2023-02-13 RX ORDER — FENTANYL CITRATE 50 UG/ML
100 INJECTION, SOLUTION INTRAMUSCULAR; INTRAVENOUS
Status: DISCONTINUED | OUTPATIENT
Start: 2023-02-13 | End: 2023-02-14 | Stop reason: HOSPADM

## 2023-02-13 RX ORDER — OXYTOCIN/0.9 % SODIUM CHLORIDE 30/500 ML
100-340 PLASTIC BAG, INJECTION (ML) INTRAVENOUS CONTINUOUS PRN
Status: DISCONTINUED | OUTPATIENT
Start: 2023-02-13 | End: 2023-02-16 | Stop reason: HOSPADM

## 2023-02-13 RX ORDER — KETOROLAC TROMETHAMINE 30 MG/ML
30 INJECTION, SOLUTION INTRAMUSCULAR; INTRAVENOUS
Status: DISCONTINUED | OUTPATIENT
Start: 2023-02-13 | End: 2023-02-16 | Stop reason: HOSPADM

## 2023-02-13 RX ORDER — ACETAMINOPHEN 325 MG/1
650 TABLET ORAL EVERY 4 HOURS PRN
Status: DISCONTINUED | OUTPATIENT
Start: 2023-02-13 | End: 2023-02-14 | Stop reason: HOSPADM

## 2023-02-13 RX ORDER — EPHEDRINE SULFATE 50 MG/ML
INJECTION, SOLUTION INTRAMUSCULAR; INTRAVENOUS; SUBCUTANEOUS
Status: COMPLETED
Start: 2023-02-13 | End: 2023-02-13

## 2023-02-13 RX ORDER — LIDOCAINE 40 MG/G
CREAM TOPICAL
Status: DISCONTINUED | OUTPATIENT
Start: 2023-02-13 | End: 2023-02-14 | Stop reason: HOSPADM

## 2023-02-13 RX ORDER — FENTANYL CITRATE-0.9 % NACL/PF 10 MCG/ML
100 PLASTIC BAG, INJECTION (ML) INTRAVENOUS EVERY 5 MIN PRN
Status: DISCONTINUED | OUTPATIENT
Start: 2023-02-13 | End: 2023-02-14 | Stop reason: HOSPADM

## 2023-02-13 RX ORDER — MISOPROSTOL 200 UG/1
800 TABLET ORAL
Status: DISCONTINUED | OUTPATIENT
Start: 2023-02-13 | End: 2023-02-14 | Stop reason: HOSPADM

## 2023-02-13 RX ORDER — NICOTINE POLACRILEX 4 MG
15-30 LOZENGE BUCCAL
Status: DISCONTINUED | OUTPATIENT
Start: 2023-02-13 | End: 2023-02-14

## 2023-02-13 RX ORDER — PROCHLORPERAZINE MALEATE 10 MG
10 TABLET ORAL EVERY 6 HOURS PRN
Status: DISCONTINUED | OUTPATIENT
Start: 2023-02-13 | End: 2023-02-14 | Stop reason: HOSPADM

## 2023-02-13 RX ORDER — METOCLOPRAMIDE HYDROCHLORIDE 5 MG/ML
10 INJECTION INTRAMUSCULAR; INTRAVENOUS EVERY 6 HOURS PRN
Status: DISCONTINUED | OUTPATIENT
Start: 2023-02-13 | End: 2023-02-14 | Stop reason: HOSPADM

## 2023-02-13 RX ORDER — PROCHLORPERAZINE 25 MG
25 SUPPOSITORY, RECTAL RECTAL EVERY 12 HOURS PRN
Status: DISCONTINUED | OUTPATIENT
Start: 2023-02-13 | End: 2023-02-14 | Stop reason: HOSPADM

## 2023-02-13 RX ORDER — METHYLERGONOVINE MALEATE 0.2 MG/ML
200 INJECTION INTRAVENOUS
Status: DISCONTINUED | OUTPATIENT
Start: 2023-02-13 | End: 2023-02-14 | Stop reason: HOSPADM

## 2023-02-13 RX ORDER — ONDANSETRON 2 MG/ML
4 INJECTION INTRAMUSCULAR; INTRAVENOUS EVERY 6 HOURS PRN
Status: DISCONTINUED | OUTPATIENT
Start: 2023-02-13 | End: 2023-02-14 | Stop reason: HOSPADM

## 2023-02-13 RX ORDER — OXYTOCIN 10 [USP'U]/ML
10 INJECTION, SOLUTION INTRAMUSCULAR; INTRAVENOUS
Status: DISCONTINUED | OUTPATIENT
Start: 2023-02-13 | End: 2023-02-14 | Stop reason: HOSPADM

## 2023-02-13 RX ORDER — TRANEXAMIC ACID 10 MG/ML
1 INJECTION, SOLUTION INTRAVENOUS EVERY 30 MIN PRN
Status: DISCONTINUED | OUTPATIENT
Start: 2023-02-13 | End: 2023-02-14 | Stop reason: HOSPADM

## 2023-02-13 RX ORDER — SODIUM CHLORIDE, SODIUM LACTATE, POTASSIUM CHLORIDE, CALCIUM CHLORIDE 600; 310; 30; 20 MG/100ML; MG/100ML; MG/100ML; MG/100ML
INJECTION, SOLUTION INTRAVENOUS CONTINUOUS PRN
Status: DISCONTINUED | OUTPATIENT
Start: 2023-02-13 | End: 2023-02-14 | Stop reason: HOSPADM

## 2023-02-13 RX ORDER — DEXTROSE MONOHYDRATE 25 G/50ML
25-50 INJECTION, SOLUTION INTRAVENOUS
Status: DISCONTINUED | OUTPATIENT
Start: 2023-02-13 | End: 2023-02-14

## 2023-02-13 RX ORDER — MISOPROSTOL 200 UG/1
400 TABLET ORAL
Status: DISCONTINUED | OUTPATIENT
Start: 2023-02-13 | End: 2023-02-14 | Stop reason: HOSPADM

## 2023-02-13 RX ORDER — ONDANSETRON 4 MG/1
4 TABLET, ORALLY DISINTEGRATING ORAL EVERY 6 HOURS PRN
Status: DISCONTINUED | OUTPATIENT
Start: 2023-02-13 | End: 2023-02-14 | Stop reason: HOSPADM

## 2023-02-13 RX ORDER — NALOXONE HYDROCHLORIDE 0.4 MG/ML
0.2 INJECTION, SOLUTION INTRAMUSCULAR; INTRAVENOUS; SUBCUTANEOUS
Status: DISCONTINUED | OUTPATIENT
Start: 2023-02-13 | End: 2023-02-14 | Stop reason: HOSPADM

## 2023-02-13 RX ORDER — METOCLOPRAMIDE 10 MG/1
10 TABLET ORAL EVERY 6 HOURS PRN
Status: DISCONTINUED | OUTPATIENT
Start: 2023-02-13 | End: 2023-02-14 | Stop reason: HOSPADM

## 2023-02-13 RX ORDER — CARBOPROST TROMETHAMINE 250 UG/ML
250 INJECTION, SOLUTION INTRAMUSCULAR
Status: DISCONTINUED | OUTPATIENT
Start: 2023-02-13 | End: 2023-02-14 | Stop reason: HOSPADM

## 2023-02-13 RX ORDER — FENTANYL/ROPIVACAINE/NS/PF 2MCG/ML-.1
PLASTIC BAG, INJECTION (ML) EPIDURAL
Status: DISCONTINUED | OUTPATIENT
Start: 2023-02-13 | End: 2023-02-14 | Stop reason: HOSPADM

## 2023-02-13 RX ORDER — NALBUPHINE HYDROCHLORIDE 10 MG/ML
2.5-5 INJECTION, SOLUTION INTRAMUSCULAR; INTRAVENOUS; SUBCUTANEOUS EVERY 6 HOURS PRN
Status: DISCONTINUED | OUTPATIENT
Start: 2023-02-13 | End: 2023-02-16 | Stop reason: HOSPADM

## 2023-02-13 RX ORDER — OXYTOCIN/0.9 % SODIUM CHLORIDE 30/500 ML
1-24 PLASTIC BAG, INJECTION (ML) INTRAVENOUS CONTINUOUS
Status: DISCONTINUED | OUTPATIENT
Start: 2023-02-13 | End: 2023-02-14 | Stop reason: HOSPADM

## 2023-02-13 RX ORDER — OXYTOCIN 10 [USP'U]/ML
10 INJECTION, SOLUTION INTRAMUSCULAR; INTRAVENOUS
Status: DISCONTINUED | OUTPATIENT
Start: 2023-02-13 | End: 2023-02-16 | Stop reason: HOSPADM

## 2023-02-13 RX ORDER — OXYTOCIN/0.9 % SODIUM CHLORIDE 30/500 ML
340 PLASTIC BAG, INJECTION (ML) INTRAVENOUS CONTINUOUS PRN
Status: DISCONTINUED | OUTPATIENT
Start: 2023-02-13 | End: 2023-02-14 | Stop reason: HOSPADM

## 2023-02-13 RX ORDER — IBUPROFEN 800 MG/1
800 TABLET, FILM COATED ORAL
Status: DISCONTINUED | OUTPATIENT
Start: 2023-02-13 | End: 2023-02-16 | Stop reason: HOSPADM

## 2023-02-13 RX ORDER — CITRIC ACID/SODIUM CITRATE 334-500MG
30 SOLUTION, ORAL ORAL
Status: DISCONTINUED | OUTPATIENT
Start: 2023-02-13 | End: 2023-02-14 | Stop reason: HOSPADM

## 2023-02-13 RX ADMIN — INSULIN DETEMIR 12 UNITS: 100 INJECTION, SOLUTION SUBCUTANEOUS at 22:32

## 2023-02-13 RX ADMIN — Medication 100 MCG: at 21:18

## 2023-02-13 RX ADMIN — FENTANYL CITRATE 100 MCG: 50 INJECTION, SOLUTION INTRAMUSCULAR; INTRAVENOUS at 18:15

## 2023-02-13 RX ADMIN — Medication: at 21:00

## 2023-02-13 RX ADMIN — Medication 4 MILLI-UNITS/MIN: at 17:41

## 2023-02-13 RX ADMIN — Medication 6 MILLI-UNITS/MIN: at 18:16

## 2023-02-13 RX ADMIN — BUPIVACAINE HYDROCHLORIDE 10 ML: 2.5 INJECTION, SOLUTION EPIDURAL; INFILTRATION; INTRACAUDAL at 20:23

## 2023-02-13 RX ADMIN — SODIUM CHLORIDE, POTASSIUM CHLORIDE, SODIUM LACTATE AND CALCIUM CHLORIDE: 600; 310; 30; 20 INJECTION, SOLUTION INTRAVENOUS at 15:54

## 2023-02-13 RX ADMIN — Medication 2 MILLI-UNITS/MIN: at 15:55

## 2023-02-13 RX ADMIN — SODIUM CHLORIDE, POTASSIUM CHLORIDE, SODIUM LACTATE AND CALCIUM CHLORIDE: 600; 310; 30; 20 INJECTION, SOLUTION INTRAVENOUS at 20:30

## 2023-02-13 ASSESSMENT — ACTIVITIES OF DAILY LIVING (ADL)
ADLS_ACUITY_SCORE: 18

## 2023-02-13 NOTE — H&P
"ADMIT NOTE  =================  39w4d    Elham Agarwal is a 33 year old female with an Patient's last menstrual period was 2022 (exact date). and Estimated Date of Delivery: 2023 [facility] at 6:26 AM in early labor.     HPI  ================  Elham reports that she began to have contractions around 0145 this morning, they have progressively gotten closer together and stronger. Denies LOF or vaginal bleeding. Her pregnancy is complicated by GDM insulin controlled, she was scheduled for IOL on 23.     Denies fever, cough, SOB or chest pain.  Agreeable to Covid-19 testing  Contractions- every 3 minutes  Fetal movement- active  ROM- no.  Vaginal bleeding- none  GBS- negative  FOB- is involved, Jaxson  Other labor support- none    Weight gain- 148 - 159 lbs, Total weight gain- 11 lbs  Height- 5'3\"  BMI- 26  First prenatal visit at 10 weeks, Total visits- 11    PROBLEM LIST  =================  Patient Active Problem List    Diagnosis Date Noted     Labor and delivery, indication for care 2023     Priority: Medium     Gestational diabetes mellitus (GDM) requiring insulin 2022     Priority: Medium     22: poorly controlled so far, consult with Vadim after visit today to discuss need for insulin. BPPs and growth US ordered for presumed GDMA2  : increased insulin w Vadim consult.  BPPs 2x week and growth q 3-4 wks, started discussion re IOL between 39-39.6       Vitamin D deficiency 2022     Priority: Medium     Vit d = 27, started supplement  : vit d most recent 39       Dermoid cyst 2022     Priority: Medium     22- MFM US- rt dermoid cyst [id number]  10/7- MFM US rt cyst still present, recommend re-imaging postpartum       High-risk pregnancy, unspecified trimester 2022     Priority: Medium     [facility] CNM pt  Partner's name: Jaxson  [ ] NOB folder  [ ] Dating  [x ] First tri screen completed  [ ] QS/AFP ordered " declined  [x ] Fetal anatomy US ordered  [x ] Rubella immune  [ ] Hep B immune/nonimmune  [x ] Pap completed  in [place]  [ ] Started ASA   [ ] NO YES plan utox in labor   [ ] COVID vaccine completed  _____________________________________  [x ] EOB folder  [x ] PP Contraception plan: condoms   [x ] Labor plans: hopes for un-medicated but may want epidural,  to come for labor  [na ] :  [x ] Infant feeding plan: breast   [X ] FLU shot  [x ] TDAP given  [NA] Rhogam if needed, date:  [ NA] TOLAC consent done  [NA ] Waterbirth declines, consent done  [x ] GCT, failed, GDM  ________________________________________  [ ] OTC PP meds sent  [ ] PP plans, time off, support system discussed, resources offered  [ ] Planning [facility] pkt22- [facility] US- first trimester screening wnl, NIPT____, AFP only at 15 weeks____         HISTORIES  ============  Allergies   Allergen Reactions     Misc. Sulfonamide Containing Compounds Dermatitis and Rash     Penicillins Dermatitis, Itching and Rash     Past Medical History:   Diagnosis Date     Gestational diabetes      Varicose veins of lower extremity      History reviewed. No pertinent surgical history..  Family History   Problem Relation Age of Onset     Anxiety Disorder Mother      Prostate Cancer Maternal Grandfather      Other Cancer Maternal Grandfather      Hypertension Maternal Grandmother      Depression Maternal Grandmother      Diabetes Paternal Grandfather      Hypertension Paternal Grandmother      Social History     Tobacco Use     Smoking status: Never     Smokeless tobacco: Never   Substance Use Topics     Alcohol use: Not Currently     OB History    Para Term  AB Living   1 0 0 0 0 0   SAB IAB Ectopic Multiple Live Births   0 0 0 0 0      # Outcome Date GA Lbr Joss/2nd Weight Sex Delivery Anes PTL Lv   1 Current                 LABS:   ===========  Prenatal Labs:  Rhogam not indicated   Lab Results   Component Value Date    AS Negative  "07/22/2022    RUQIGG Positive 07/22/2022    HEPBANG Nonreactive 07/22/2022    HGB 13.7 02/03/2023    HIAGAB Nonreactive 07/22/2022    GLU1 188 (H) 11/18/2022     Rubella immune  No results found for: GBS  Other labs:  COVID-19 PCR Results    COVID-19 PCR Results   No data to display.         COVID-19 Antibody Results, Testing for Immunity    COVID-19 Antibody Results, Testing for Immunity   No data to display.            No results found for this or any previous visit (from the past 24 hour(s)).    ROS  =========  Pt denies significant respiratory, cardiovacular, GI, or muscular/skeletalcomplaints.    See RN data base ROS.     PHYSICAL EXAM:  ===============  /76   Pulse 72   Temp 98.3  F (36.8  C) (Oral)   Resp 18   Ht 1.6 m (5' 3\")   Wt 71.7 kg (158 lb)   LMP 05/08/2022 (Exact Date)   Breastfeeding No   BMI 27.99 kg/m    General appearance: uncomfortable with contractions  GENERAL APPEARANCE: healthy, alert and no distress  RESP: breathing comfortably on room air   CV: well perfused  ABDOMEN:  soft, nontender, no epigastric pain  SKIN: no suspicious lesions or rashes  NEURO: Denies headache, blurred vision, other vision changes  PSYCH: mentation appears normal. and affect normal/bright  Legs: Reflexes normal bilaterally and No edema     Abdomen: gravid, vertex fetus per Leopold's, non-tender between contractions.   Cephalic presentation confirmed by BSUS  EFW-  8 lbs.   CONTRACTIONS: every 3 minutes  FETAL HEART TONES: continuous EFM- baseline 135 with moderate variability and positive accelerations. No decelerations.  PELVIC EXAM: 1/ 60%/ Mid/ average/ -2   ESCOBAR SCORE: 7  BLOODY SHOW: yes, dark brown    ROM:no  FLUID: none  ROMPLUS: not done    # Pain Assessment:  Current Pain Score 2/13/2023   Patient currently in pain? yes   - Elham is experiencing pain due to contractions. Pain management was discussed with Elham and her spouse and the plan was created in a collaborative fashion.  Elham's " response to the current recommendations: engaged  - Desires un-mediated birth       ASSESSMENT:  ==============  IUP @ 39w4d admitted in early labor   NST EQUIVOCAL  Fetal Heart Tones - category one  GBS- negative  Covid- pending  Dermoid cyst  GDM - insulin controlled   Patient Active Problem List   Diagnosis     Dermoid cyst     High-risk pregnancy, unspecified trimester     Vitamin D deficiency     Gestational diabetes mellitus (GDM) requiring insulin     Labor and delivery, indication for care       PLAN:  ===========  - Elham was scheduled for IOL secondary to GDM insulin controlled on 2/16/23. Reviewed option to remain in triage and re-evaluate cervix in 1-2 hours vs. admission to L&D and if no cervical change in several hours would augment labor. She prefers to be admitted and would accept augmentation if not making labor progress.   - Admit - see IP orders  - Pain medication options of nitrous oxide, fentanyl IV and epidural anesthesia reviewed with pt. Pt is interested in un-medicated birth.   - Ambulation, hydration, position changes, birthing ball and tub options to facilitate labor reviewed with pt .  - Reevaluate in 2-4 hours/sooner prn  *FIDENCIO Steel CNM

## 2023-02-13 NOTE — TELEPHONE ENCOUNTER
Page received, TC to patient re: contractions    Reports that she started having contractions around 0145am, they were initially 7 minutes apart but for the last hour they have been closer together about 3-4 minutes apart. Reports yesterday she passed a mucous plug, she does not believe her water has broken and denies LOF, denies vaginal bleeding, feeling normal fetal movement.     Advised evaluation on L&D, pt and  agreeable. Planning to around in 15-30 minutes.     FIDENCIO SteelM

## 2023-02-13 NOTE — PLAN OF CARE
Data: Patient presented to [facility] at 0605.  Reason for maternal/fetal assessment per patient is rule out of labor. Patient is a . Prenatal record reviewed. Gestational Age 39w4d. VSS. Fetal movement present. Patient denies cramping, backache, pelvic pressure, UTI symptoms, GI problems, vaginal bleeding, edema, headache, visual disturbances, epigastric or URQ pain, abdominal pain, rupture of membranes. Support person, Jaxson, present.     OB History    Para Term  AB Living   1 0 0 0 0 0   SAB IAB Ectopic Multiple Live Births   0 0 0 0 0      # Outcome Date GA Lbr Joss/2nd Weight Sex Delivery Anes PTL Lv   1 Current            . Medical history:   Past Medical History:   Diagnosis Date     Gestational diabetes      Varicose veins of lower extremity      Action: Verbal consent for EFM. Triage assessment completed. US and [place] applied. See flowsheet for uterine and fetal activity.   Response: Yolie Rock CNM, informed of pt arrival. SVE: 1/60%. Plan per provider is to admit pt and allow to labor for a few hours before rechecking cervix and determining POC. Pt verbalized agreement with plan. Desires to utilize hydrotherapy, the birthing ball, and nitrous oxide for pain management. Other pain management options discussed with pt. Pt transferred to room 471, oriented to room, and call light given. Report given to Mariama RIVAS and Tika RNs, at end of shift.

## 2023-02-13 NOTE — PROGRESS NOTES
"Blood pressure 121/84, pulse 68, temperature 98.4  F (36.9  C), temperature source Oral, resp. rate 20, height 1.6 m (5' 3\"), weight 71.7 kg (158 lb), last menstrual period 05/08/2022, not currently breastfeeding.  Patient Vitals for the past 24 hrs:   BP Temp Temp src Pulse Resp Height Weight   02/13/23 0743 121/84 98.4  F (36.9  C) Oral 68 20 -- --   02/13/23 0606 121/76 -- -- -- -- -- --   02/13/23 0603 -- 98.3  F (36.8  C) Oral 72 18 1.6 m (5' 3\") 71.7 kg (158 lb)     General appearance: uncomfortable with contractions. Wincing and using focused breath through each. Has been on birth ball, standing at side of bed, ambulating, and is now right side lying. Pt reports contractions have continued to increase in intensity.     Labor Progress:   0630 SVE 1/60%/-2, mathis=7, ctx Q3min, bloody show.    CONTRACTIONS: every 2-5 minutes  FETAL HEART TONES: continuous EFM- baseline 130 with moderate variability and positive accelerations. No decelerations.  ROM: not ruptured  PELVIC EXAM: deferred  BLOOD SUGAR: 89 at 1005  PAIN: is planning for an unmedicated birth and reports she is currently coping well.       ASSESSMENT:  ==============  IUP @ 39w4d in early labor   GDMA2, BS WNL  Fetal Heart Rate Tracing category one  GBS- negative  Patient Active Problem List   Diagnosis     Dermoid cyst     High-risk pregnancy, unspecified trimester     Vitamin D deficiency     Gestational diabetes mellitus (GDM) requiring insulin     Labor and delivery, indication for care     PLAN:  ===========  Discussed that contractions have continued to be regular since starting at 0145 this morning. Offered to defer cervical exam at this time, as early labor is typical slow change and no need to augment at this time.    Ambulation, hydration, position changes, birthing ball/sling and tub options to facilitate labor.   Planning for SVE ~1230, 6 hours after last   Continue antepartum blood sugar monitoring QID until active labor  Reevaluate in " "2-3 hours,  Sooner prn    FIDENCIO Shannon CNM     ADDENDUM at 1318:  S: Pt continues to breath through contractions, utilizing birth ball ambulation, and position changes. Her partner Jaxson is supporting her at the bedside.     O: /75   Pulse 68   Temp 97.7  F (36.5  C) (Oral)   Resp 16   Ht 1.6 m (5' 3\")   Wt 71.7 kg (158 lb)   LMP 05/08/2022 (Exact Date)   Breastfeeding No   BMI 27.99 kg/m    CONTRACTIONS: every 2-7 minutes with some irritability   FETAL HEART TONES: continuous EFM- baseline 130 with moderate variability and positive accelerations. No decelerations.  ROM: not ruptured  PELVIC EXAM: 2cm, 90%, -2, mid, soft = 8    A: Early labor, GDMA2     P: Discussed that slow cervical change is normal in early labor. Reviewed r/b/a to starting Pitocin augmentation. Pt would like to start this. Discussed administration, dosing, and goal for contraction pattern.   Reviewed plan to start active diabetes protocol once 6cm with goal of keeping BS <110. Pt agreeable.   Continue ambulation, birth ball, and position changes. Pt aware that bath tub and shower are an option at any time.     FIDENCIO Shannon CNM        "

## 2023-02-14 PROBLEM — O13.9 GESTATIONAL HYPERTENSION: Status: ACTIVE | Noted: 2023-02-14

## 2023-02-14 LAB
GLUCOSE BLDC GLUCOMTR-MCNC: 124 MG/DL (ref 70–99)
GLUCOSE BLDC GLUCOMTR-MCNC: 152 MG/DL (ref 70–99)
GLUCOSE BLDC GLUCOMTR-MCNC: 78 MG/DL (ref 70–99)
GLUCOSE BLDC GLUCOMTR-MCNC: 80 MG/DL (ref 70–99)
GLUCOSE BLDC GLUCOMTR-MCNC: 84 MG/DL (ref 70–99)
GLUCOSE BLDC GLUCOMTR-MCNC: 90 MG/DL (ref 70–99)

## 2023-02-14 PROCEDURE — 250N000009 HC RX 250: Performed by: MIDWIFE

## 2023-02-14 PROCEDURE — 258N000003 HC RX IP 258 OP 636: Performed by: MIDWIFE

## 2023-02-14 PROCEDURE — 59400 OBSTETRICAL CARE: CPT | Performed by: MIDWIFE

## 2023-02-14 PROCEDURE — 250N000013 HC RX MED GY IP 250 OP 250 PS 637: Performed by: MIDWIFE

## 2023-02-14 PROCEDURE — 722N000001 HC LABOR CARE VAGINAL DELIVERY SINGLE

## 2023-02-14 PROCEDURE — 120N000002 HC R&B MED SURG/OB UMMC

## 2023-02-14 RX ORDER — NICOTINE POLACRILEX 4 MG
15-30 LOZENGE BUCCAL
Status: DISCONTINUED | OUTPATIENT
Start: 2023-02-14 | End: 2023-02-14 | Stop reason: HOSPADM

## 2023-02-14 RX ORDER — BISACODYL 10 MG
10 SUPPOSITORY, RECTAL RECTAL DAILY PRN
Status: DISCONTINUED | OUTPATIENT
Start: 2023-02-14 | End: 2023-02-16 | Stop reason: HOSPADM

## 2023-02-14 RX ORDER — DOCUSATE SODIUM 100 MG/1
100 CAPSULE, LIQUID FILLED ORAL DAILY
Status: DISCONTINUED | OUTPATIENT
Start: 2023-02-14 | End: 2023-02-16 | Stop reason: HOSPADM

## 2023-02-14 RX ORDER — SODIUM CHLORIDE, SODIUM LACTATE, POTASSIUM CHLORIDE, CALCIUM CHLORIDE 600; 310; 30; 20 MG/100ML; MG/100ML; MG/100ML; MG/100ML
10-125 INJECTION, SOLUTION INTRAVENOUS CONTINUOUS
Status: CANCELLED | OUTPATIENT
Start: 2023-02-14

## 2023-02-14 RX ORDER — LORAZEPAM 2 MG/ML
2 INJECTION INTRAMUSCULAR
Status: DISCONTINUED | OUTPATIENT
Start: 2023-02-14 | End: 2023-02-16 | Stop reason: HOSPADM

## 2023-02-14 RX ORDER — SODIUM CHLORIDE 9 MG/ML
1000 INJECTION, SOLUTION INTRAVENOUS CONTINUOUS
Status: DISCONTINUED | OUTPATIENT
Start: 2023-02-14 | End: 2023-02-14 | Stop reason: HOSPADM

## 2023-02-14 RX ORDER — MISOPROSTOL 200 UG/1
400 TABLET ORAL
Status: DISCONTINUED | OUTPATIENT
Start: 2023-02-14 | End: 2023-02-16 | Stop reason: HOSPADM

## 2023-02-14 RX ORDER — TRANEXAMIC ACID 10 MG/ML
1 INJECTION, SOLUTION INTRAVENOUS EVERY 30 MIN PRN
Status: DISCONTINUED | OUTPATIENT
Start: 2023-02-14 | End: 2023-02-16 | Stop reason: HOSPADM

## 2023-02-14 RX ORDER — OXYTOCIN/0.9 % SODIUM CHLORIDE 30/500 ML
340 PLASTIC BAG, INJECTION (ML) INTRAVENOUS CONTINUOUS PRN
Status: DISCONTINUED | OUTPATIENT
Start: 2023-02-14 | End: 2023-02-16 | Stop reason: HOSPADM

## 2023-02-14 RX ORDER — MISOPROSTOL 200 UG/1
800 TABLET ORAL
Status: DISCONTINUED | OUTPATIENT
Start: 2023-02-14 | End: 2023-02-16 | Stop reason: HOSPADM

## 2023-02-14 RX ORDER — CARBOPROST TROMETHAMINE 250 UG/ML
250 INJECTION, SOLUTION INTRAMUSCULAR
Status: DISCONTINUED | OUTPATIENT
Start: 2023-02-14 | End: 2023-02-16 | Stop reason: HOSPADM

## 2023-02-14 RX ORDER — OXYTOCIN 10 [USP'U]/ML
INJECTION, SOLUTION INTRAMUSCULAR; INTRAVENOUS
Status: DISCONTINUED
Start: 2023-02-14 | End: 2023-02-14 | Stop reason: WASHOUT

## 2023-02-14 RX ORDER — ACETAMINOPHEN 325 MG/1
650 TABLET ORAL EVERY 6 HOURS PRN
Qty: 100 TABLET | Refills: 0 | Status: SHIPPED | OUTPATIENT
Start: 2023-02-14 | End: 2023-03-31

## 2023-02-14 RX ORDER — AMOXICILLIN 250 MG
1 CAPSULE ORAL DAILY
Qty: 100 TABLET | Refills: 0 | Status: SHIPPED | OUTPATIENT
Start: 2023-02-14 | End: 2023-03-31

## 2023-02-14 RX ORDER — DEXTROSE MONOHYDRATE 25 G/50ML
25-50 INJECTION, SOLUTION INTRAVENOUS
Status: DISCONTINUED | OUTPATIENT
Start: 2023-02-14 | End: 2023-02-16 | Stop reason: HOSPADM

## 2023-02-14 RX ORDER — MISOPROSTOL 200 UG/1
TABLET ORAL
Status: DISCONTINUED
Start: 2023-02-14 | End: 2023-02-14 | Stop reason: HOSPADM

## 2023-02-14 RX ORDER — OXYTOCIN 10 [USP'U]/ML
10 INJECTION, SOLUTION INTRAMUSCULAR; INTRAVENOUS
Status: DISCONTINUED | OUTPATIENT
Start: 2023-02-14 | End: 2023-02-16 | Stop reason: HOSPADM

## 2023-02-14 RX ORDER — MAGNESIUM SULFATE HEPTAHYDRATE 40 MG/ML
4 INJECTION, SOLUTION INTRAVENOUS
Status: DISCONTINUED | OUTPATIENT
Start: 2023-02-14 | End: 2023-02-16 | Stop reason: HOSPADM

## 2023-02-14 RX ORDER — ACETAMINOPHEN 325 MG/1
650 TABLET ORAL EVERY 4 HOURS PRN
Status: DISCONTINUED | OUTPATIENT
Start: 2023-02-14 | End: 2023-02-16 | Stop reason: HOSPADM

## 2023-02-14 RX ORDER — MAGNESIUM SULFATE HEPTAHYDRATE 40 MG/ML
2 INJECTION, SOLUTION INTRAVENOUS
Status: DISCONTINUED | OUTPATIENT
Start: 2023-02-14 | End: 2023-02-16 | Stop reason: HOSPADM

## 2023-02-14 RX ORDER — HYDRALAZINE HYDROCHLORIDE 20 MG/ML
10 INJECTION INTRAMUSCULAR; INTRAVENOUS
Status: DISCONTINUED | OUTPATIENT
Start: 2023-02-14 | End: 2023-02-16 | Stop reason: HOSPADM

## 2023-02-14 RX ORDER — IBUPROFEN 800 MG/1
800 TABLET, FILM COATED ORAL EVERY 6 HOURS PRN
Status: DISCONTINUED | OUTPATIENT
Start: 2023-02-14 | End: 2023-02-16 | Stop reason: HOSPADM

## 2023-02-14 RX ORDER — METHYLERGONOVINE MALEATE 0.2 MG/ML
200 INJECTION INTRAVENOUS
Status: DISCONTINUED | OUTPATIENT
Start: 2023-02-14 | End: 2023-02-16 | Stop reason: HOSPADM

## 2023-02-14 RX ORDER — HYDROCORTISONE 25 MG/G
CREAM TOPICAL 3 TIMES DAILY PRN
Status: DISCONTINUED | OUTPATIENT
Start: 2023-02-14 | End: 2023-02-16 | Stop reason: HOSPADM

## 2023-02-14 RX ORDER — MODIFIED LANOLIN
OINTMENT (GRAM) TOPICAL
Status: DISCONTINUED | OUTPATIENT
Start: 2023-02-14 | End: 2023-02-16 | Stop reason: HOSPADM

## 2023-02-14 RX ORDER — LABETALOL HYDROCHLORIDE 5 MG/ML
20-80 INJECTION, SOLUTION INTRAVENOUS EVERY 10 MIN PRN
Status: DISCONTINUED | OUTPATIENT
Start: 2023-02-14 | End: 2023-02-16 | Stop reason: HOSPADM

## 2023-02-14 RX ORDER — IBUPROFEN 600 MG/1
600 TABLET, FILM COATED ORAL EVERY 6 HOURS PRN
Qty: 60 TABLET | Refills: 0 | Status: SHIPPED | OUTPATIENT
Start: 2023-02-14 | End: 2023-03-31

## 2023-02-14 RX ORDER — MAGNESIUM SULFATE HEPTAHYDRATE 500 MG/ML
10 INJECTION, SOLUTION INTRAMUSCULAR; INTRAVENOUS
Status: DISCONTINUED | OUTPATIENT
Start: 2023-02-14 | End: 2023-02-16 | Stop reason: HOSPADM

## 2023-02-14 RX ORDER — DEXTROSE MONOHYDRATE 25 G/50ML
25-50 INJECTION, SOLUTION INTRAVENOUS
Status: DISCONTINUED | OUTPATIENT
Start: 2023-02-14 | End: 2023-02-14 | Stop reason: HOSPADM

## 2023-02-14 RX ORDER — NICOTINE POLACRILEX 4 MG
15-30 LOZENGE BUCCAL
Status: DISCONTINUED | OUTPATIENT
Start: 2023-02-14 | End: 2023-02-16 | Stop reason: HOSPADM

## 2023-02-14 RX ADMIN — ACETAMINOPHEN 650 MG: 325 TABLET ORAL at 10:42

## 2023-02-14 RX ADMIN — DOCUSATE SODIUM 100 MG: 100 CAPSULE, LIQUID FILLED ORAL at 10:38

## 2023-02-14 RX ADMIN — SODIUM CHLORIDE, POTASSIUM CHLORIDE, SODIUM LACTATE AND CALCIUM CHLORIDE: 600; 310; 30; 20 INJECTION, SOLUTION INTRAVENOUS at 01:30

## 2023-02-14 RX ADMIN — SODIUM CHLORIDE 1000 ML: 9 INJECTION, SOLUTION INTRAVENOUS at 01:30

## 2023-02-14 RX ADMIN — IBUPROFEN 800 MG: 800 TABLET, FILM COATED ORAL at 21:46

## 2023-02-14 RX ADMIN — HUMAN INSULIN: 100 INJECTION, SOLUTION SUBCUTANEOUS at 01:25

## 2023-02-14 ASSESSMENT — ACTIVITIES OF DAILY LIVING (ADL)
ADLS_ACUITY_SCORE: 18

## 2023-02-14 NOTE — PROVIDER NOTIFICATION
02/14/23 1529   Provider Notification   Provider Name/Title Page, CNM   Method of Notification Electronic Page   Request Evaluate-Remote   Notification Reason Other   Just noticed pt has preeclampsia orderset.  BP WDL, no symptoms, labs look WDL.  Do you want to discontinue this orderset?  If not, please call to discuss.  Thank yoU!    Meri Page, CNM will discontinue daily weights and strict I&O.

## 2023-02-14 NOTE — PLAN OF CARE
[id number]:  VSS and postpartum assessments WDL.  Up ad ami with steady gait and independent with cares.  Pt unable to fully empty bladder this morning, bladder fymi=843, straight dvgb=143ih.  Pt has since been voiding and states she now feels like she is emptying and no longer feels like she still has to pee after going like she did this morning.  Bonding well with infant.  Infant too sleepy for breastfeeding this shift, plan to assist with bringing to breast this evening.  Infant supplemented with donor breastmilk via fingerfeeding, refused to suck this morning, took 5mls x2 with encouragement this afternoon.  Mother started to pump this afternoon.  Pain managed with tylenol per MAR.  Blood sugar before breakfast=78, pt has not yet ordered lunch.  , Jaxson present and supportive.  Will continue with postpartum cares and education per plan of care.

## 2023-02-14 NOTE — PROGRESS NOTES
"Labor progress note    S:  Elham continues to be comfortable with her epidural, now sleeping.     O:  Blood pressure 110/64, pulse 68, temperature 97.7  F (36.5  C), temperature source Oral, resp. rate 18, height 1.6 m (5' 3\"), weight 71.7 kg (158 lb), last menstrual period 2022, SpO2 99 %, not currently breastfeeding.    Contractions: Every 2-3 minutes. 60-90 seconds duration.   FHT: Baseline 140 with periods of moderate variability. Accelerations absent. Decelerations absent.   ROM: not ruptured.  Pelvic exam: deferred. Last SVE at  4.5/ 90/ Anterior/ soft/ -1  Pitocin-  6 mu/min.  BP>140s/90s x2, 4 hours apart  Blood sugar 128 after eating    A:  IUP @ 39w4d early labor with Pitocin augmentation  Fetal heart rate tracing category one  GDMA2, blood sugars WNL    Patient Active Problem List   Diagnosis     Dermoid cyst     High-risk pregnancy, unspecified trimester     Vitamin D deficiency     Gestational diabetes mellitus (GDM) requiring insulin     Labor and delivery, indication for care     P:  Continue frequent position changes to facilitate labor with the epidural  Reevaluate in 2-3 hours, sooner prn    FIDENCIO Shannon CNM    ADDENDUM at 0011:  S: Pt reports feeling a change in pressure. Woke with a feeling that she needs to have a BM.     O: Contractions: Every 2-3 minutes. 60-90 seconds duration.   FHT: Baseline 140 with periods of moderate variability. Accelerations absent. Decelerations absent.   ROM: not ruptured  Pelvic exam: 8cm, 100%, 0     A: Active labor augmented with Pitocin  GDMA2, blood sugars WNL  Intact membranes    P: Continue position changes and use of peanut ball   Start active labor diabetes management protocol  Anticipate     FIDENCIO Shannon CNM    "

## 2023-02-14 NOTE — PROGRESS NOTES
Postpartum Day #1 Note:  SIGNIFICANT PROBLEMS:  Patient Active Problem List    Diagnosis Date Noted     Gestational hypertension 02/14/2023     Priority: Medium     Diagnosed in labor, waiting on urine protein results       Labor and delivery, indication for care 02/03/2023     Priority: Medium     Gestational diabetes mellitus (GDM) requiring insulin 11/22/2022     Priority: Medium     12/9/22: poorly controlled so far, consult with Vadim after visit today to discuss need for insulin. BPPs and growth US ordered for presumed GDMA2  12/23: increased insulin w Vadim consult.  BPPs 2x week and growth q 3-4 wks, started discussion re IOL between 39-39.6       Vitamin D deficiency 09/20/2022     Priority: Medium     Vit d = 27, started supplement  12/9: vit d most recent 39       Dermoid cyst 08/08/2022     Priority: Medium     8/5/22- MFM US- rt dermoid cyst [id number]  10/7- MFM US rt cyst still present, recommend re-imaging postpartum       High-risk pregnancy, unspecified trimester 08/08/2022     Priority: Medium     [facility] CNM pt  Partner's name: Jaxson  [ ] NOB folder  [ ] Dating  [x ] First tri screen completed  [ ] QS/AFP ordered declined  [x ] Fetal anatomy US ordered  [x ] Rubella immune  [ ] Hep B immune/nonimmune  [x ] Pap completed 2021 in [place]  [ ] Started ASA   [ ] NO YES plan utox in labor   [ ] COVID vaccine completed  _____________________________________  [x ] EOB folder  [x ] PP Contraception plan: condoms   [x ] Labor plans: hopes for un-medicated but may want epidural,  to come for labor  [na ] :  [x ] Infant feeding plan: breast   [X ] FLU shot  [x ] TDAP given  [NA] Rhogam if needed, date:  [ NA] TOLAC consent done  [NA ] Waterbirth declines, consent done  [x ] GCT, failed, GDM  ________________________________________  [ ] OTC PP meds sent  [ ] PP plans, time off, support system discussed, resources offered  [ ] Planning [facility] pkt8/5/22- [facility] US- first trimester screening wnl,  "NIPT____, AFP only at 15 weeks____         INTERVAL HISTORY:  /75 (BP Location: Left arm, Patient Position: Semi-Soto's, Cuff Size: Adult Regular)   Pulse 67   Temp 97.6  F (36.4  C) (Oral)   Resp 18   Ht 1.6 m (5' 3\")   Wt 71.7 kg (158 lb)   LMP 05/08/2022 (Exact Date)   SpO2 98%   Breastfeeding Unknown   BMI 27.99 kg/m       Recent Labs   Lab 02/14/23  0953 02/14/23  0536 02/14/23  0244 02/14/23  0010 02/13/23  2203 02/13/23  1926   GLC 78 152* 90 124* 127* 88       Pt stable, baby is rooming in.   Breast feeding status:initiated. Has had some difficult with latch and has been supplementing with donor milk. RN was at bedside assisting with lactation. Using pump when baby does not latch.   Complications since 2 hours post delivery: incomplete voiding  Initially having difficulty voiding stating feeling of incomplete emptying. RN reports straight cath and volume of 800c of urine. Patient reports now voiding without difficulty. No BM yet, passing flatus, accepting of stool softener.   Patient is tolerating activity well. Cramping is minimal, lochia is decreasing and patient denies clots.  Perineal pain is is minimal.      Physical Exam:  General: bright affect, loving with baby  Breasts: soft, nontender, nipples intact per RN assessment  Abdomen: soft, nontender, fundus at U-1 per RN assessment  Lochia: scant amount  Extremities: no edema    Postpartum hemoglobin   Hemoglobin   Date Value Ref Range Status   02/13/2023 13.3 11.7 - 15.7 g/dL Final     Blood type No results found for: ABO  No results found for: RH  Rubella status No results found for: RUBELLAABIGG      ASSESSMENT/PLAN:  Normal postpartum exam, stable Post-partum day #1  Complications:breastfeeding difficulties and incomplete voiding, currently able to void  Plan d/c home tomorrow.     Current Discharge Medication List      CONTINUE these medications which have NOT CHANGED    Details   insulin detemir (LEVEMIR PEN) 100 UNIT/ML pen Inject " 12 Units Subcutaneous At Bedtime  Qty: 15 mL, Refills: 1    Associated Diagnoses: Insulin controlled gestational diabetes mellitus (GDM) in third trimester      Prenatal Vit-Fe Fumarate-FA (PRENATAL MULTIVITAMIN W/IRON) 27-0.8 MG tablet Take 1 tablet by mouth daily      acetone urine (KETOSTIX) test strip Check urine ketones when you wake up every morning for 7 days. If negative everyday, reduce testing to once a week.  Qty: 50 strip, Refills: 1    Associated Diagnoses: Gestational diabetes mellitus      blood glucose (NO BRAND SPECIFIED) lancets standard Use to test blood sugar 4 times daily or as directed.  Qty: 1 each, Refills: 3    Associated Diagnoses: Gestational diabetes mellitus (GDM) in third trimester, gestational diabetes method of control unspecified      blood glucose (NO BRAND SPECIFIED) test strip Use to test blood sugar 4 times daily or as directed.  Qty: 500 strip, Refills: 3    Associated Diagnoses: Gestational diabetes mellitus (GDM) in third trimester, gestational diabetes method of control unspecified      blood glucose monitoring (NO BRAND SPECIFIED) meter device kit Use to test blood sugar 4 times daily or as directed.  Qty: 1 kit, Refills: 0    Associated Diagnoses: Gestational diabetes mellitus (GDM) in third trimester, gestational diabetes method of control unspecified      blood glucose monitoring (ONE TOUCH ULTRA 2) meter device kit       insulin pen needle (32G X 4 MM) 32G X 4 MM miscellaneous Use 1 pen needles daily or as directed.  Qty: 50 each, Refills: 1    Associated Diagnoses: Insulin controlled gestational diabetes mellitus (GDM) in third trimester      Lancets (ONETOUCH DELICA PLUS [id number]) MISC            I, DIOR Sepulveda am serving as a scribe; to document services personally performed by  FIDENCIO Caba, ESCOBAR  based on data collection and the provider's statements to me.     DIOR Sepulveda  I agree with the PFSH and ROS as completed by the  student, except for changes made by me. The remainder of the encounter was performed by me and scribed by the student. The scribed note accurately reflects my personal services and decisions made by me.  Meri Lozano, AISHA, CNM, APRN

## 2023-02-14 NOTE — PLAN OF CARE
Patient transferred to postpartum room 7133 in stable condition. Report given to Mirna LILLY.

## 2023-02-14 NOTE — PROGRESS NOTES
"Labor progress note    S:  Elham is now resting comfortably with her epidural. She had some bloody show during the epidural placement and continues to feel pressure even with good pain control from the epidural.     O:  Blood pressure 119/81, pulse 68, temperature 97.7  F (36.5  C), temperature source Oral, resp. rate 16, height 1.6 m (5' 3\"), weight 71.7 kg (158 lb), last menstrual period 2022, SpO2 99 %, not currently breastfeeding.  General appearance: comfortable.  Contractions: Every 2-3 minutes. 70-90 seconds duration.   FHT: Baseline 125 with periods of moderate and minimal variability. Accelerations absentsent. Intermittent variable decelerations present, late deceleration x2.  ROM: not ruptured.  Pelvic exam: 4.5/ 90/ Anterior/ soft/ -1  Pitocin-  6 mu/min.,  Antibiotics- none  BP>140s/90s x2, 4 hours apart  BP after epidural has been significantly lower than prior to epidural    Recent Labs   Lab 23  1926 23  1358 23  1005   GLC 88 93 89       A:  IUP @ 39w4d early labor with Pitocin augmentation  Fetal Heart rate tracing category two  GDMA2, blood sugars WNL  Late decelerations may be related to decreased BP following epidural    Patient Active Problem List   Diagnosis     Dermoid cyst     High-risk pregnancy, unspecified trimester     Vitamin D deficiency     Gestational diabetes mellitus (GDM) requiring insulin     Labor and delivery, indication for care     P:  Pain medication- epidural placed at , continue position changes to assist with fetal rotation and descent  Anticipate   Labor augmentation with Pitocin; continue to titrate per protocol  Treat decreased BP with phenylephrine  Per the category II algorithm, the plan is to continue observe/conservative management. Reevaluate in 60 minutes.   Initiate active diabetes protocol once in active labor  Reevaluate in 2-3 hours, sooner prn    I, Fina Smith, completed the PFSH and ROS. I then acted as a scribe for CNM for " the remainder of the visit. - Fina Smith, RN SNM    I agree with the PFSH and ROS as completed by the SNM, except for changes made by me. The remainder of the encounter was performed by me and scribed by the SNM. The scribed note accurately reflects my personal services and decisions made by me.    FIDENCIO ShannonM

## 2023-02-14 NOTE — L&D DELIVERY NOTE
Delivery Summary    Elham Agarwal MRN# [id number]   Age: 33 year old YOB: 1990     Elham Agarwal is a 33 year old   at  39w5d presented to labor floor on 23 in early labor, working through contractions. She continued to make slow change through the day and utilized the birth ball, position changes, ambulation, and massage from Jaxson. Pitocin initiated to improve contraction pattern at 1500. Elham used N2O, had one dose of fentanyl and then received an epidural for pain management.  No patient reported or observed amniotic fluid throughout the day. Progressed to complete and +1 on 23 at 0315. Pushing initiated at 0320. Brought infant to small crown with first push and room was prepared for delivery. FHR with variable and late decels prior to delivery, periods of minimal and moderate variability throughout labor. Head delivered MOA and restituted to ROT . Nuchal cord x1, right nuchal hand. Shoulders easily delivered under maternal effort. Gush of meconium stained fluid following delivery of infant.  Live female infant delivered at 0324. Spontaneous breath, vigorous cry, well flexed, HR>100. Infant directly to maternal abdomen, skin to skin. Delayed cord clamping until cessation of pulsation, then clamped x2 and cut by SJ Puri.   30 units of pitocin infusing after baby.  Cord blood obtained for typing. Cord segment for peds. Intact placenta spontaneously delivered via schultze at 0331.   3 vessel cord. Fundus firm @ one above after fundal massage.   Vagina, perineum, and rectum inspected, perineum intact, small vaginal laceration at 5 o'clock position near introitus was repaired with 3-0 vicryl using a figure of x stitch to obtain hemostasis. Bilateral periurethral lacerations present, left repaired using 4-0 vicryl with figure of x stich. Hemostasis noted.      Mother and infant stable, continued skin to skin.    Apgars 8/9.  Weight [id number].      Delivery  Note    IUP at 39 weeks 5 days gestation delivered on 2023.     delivery of a viable Female infant.  Weight : 5 pounds 12 ounces   Apgars of 8 at 1 minute and 9 at 5 minutes.  Labor was augmented.  Medications administered  in labor:  Pain Rx Nitrous oxide and Epidural; Antibiotics No;   Perineum: Periurethral laceration and Vaginal / Sulcus  Placenta-mechanism: spontaneous, intact,  with a 3 vessel cord. IV oxytocin was given.  Quantitative Blood Loss was 256 mL.  Complications of pregnancy, labor and delivery: None  Birth attendants  FIDENCIO Shannon CNM; Fina Smith RN SNM      ASSESSMENT & PLAN:        Zulema Marcum [[id number]]    Labor Event Times    Labor onset date: 23 Onset time:  6:00 AM   Dilation complete date: 23 Complete time:  3:15 AM   Start pushing date/time: 2023 0320      Labor Events     labor?: No   steroids: None  Labor Type: Augmentation  Predominate monitoring during 1st stage: continuous electronic fetal monitoring     Antibiotics received during labor?: No     Rupture identifier: Sac 1  Rupture date/time: 23 0324   Rupture type: Spontaneous rupture of membranes occuring during spontaneous labor or augmentation  Fluid color: Meconium  Fluid odor: Normal     Augmentation: Oxytocin  Augmentation date/time: 23    Indications for augmentation: Ineffective Contraction Pattern  1:1 continuous labor support provided by?: RN       Delivery/Placenta Date and Time    Delivery Date: 23 Delivery Time:  3:24 AM   Placenta Date/Time: 2023  3:31 AM  Oxytocin given at the time of delivery: after delivery of baby  Delivering clinician: Linda Grayson APRN CNM   Other personnel present at delivery:  Provider Role   Kat Manning, RN Delivery Nurse   Jonel Nugent RN Delivery Assist   Yuliana Smith Student         Vaginal Counts     Initial count performed by 2 team members:  Two Team Members   Kat Manning  "MAXX Grayson CNM       Needles Suture Needles Sponges (RETIRED) Instruments   Initial counts 2 0 5    Added to count       Relief counts       Final counts 2 2 5          Placed during labor Accounted for at the end of labor   FSE NA NA   IUPC NA NA   Cervidil NA NA              Final count performed by 2 team members:  Two Team Members   Linda Ngo RN      Final count correct?: Yes     Apgars    Living status: Living   1 Minute 5 Minute 10 Minute 15 Minute 20 Minute   Skin color: 1  2       Heart rate: 2  2       Reflex irritability: 2  1       Muscle tone: 2  2       Respiratory effort: 1  2       Total: 8  9       Apgars assigned by: LUANN NGO RN AND GALILEO VAUGHN RN     Cord    Vessels: 3 Vessels    Cord Complications: Nuchal   Nuchal Intervention: reduced         Nuchal cord description: loose nuchal cord         Cord Blood Disposition: Lab    Gases Sent?: No    Delayed cord clamping?: Yes    Cord Clamping Delay (seconds): 31-60 seconds    Stem cell collection?: No       [place] Resuscitation    Methods: None  [place] Care at Delivery: Data: female baby born at 0324. Delivery remarkable for GDM.  Action: Interventions at birth were drying and warm blankets. Infant placed skin-to-skin with mother.  Response: Stable . Positive bonding behaviors observed.          Measurements    Weight: 5 lb 12.4 oz Length: 1' 7\"   Head circumference: 33.5 cm       Skin to Skin and Feeding Plan    Skin to skin initiation date/time: 1841    Skin to skin with: Mother  Skin to skin end date/time: 1841       Labor Events and Shoulder Dystocia    Fetal Tracing Prior to Delivery: Category 2  Fetal Tracing Comments: intermittent category 2   Shoulder dystocia present?: Neg     Delivery (Maternal) (Provider to Complete) ([id number])    Episiotomy: None  Perineal lacerations: None    Periurethral laceration: bilateral Repaired?: Yes   Vaginal laceration?: Yes Repaired?: Yes "   Repair suture: 3-0 Vicryl, 4-0 Vicryl  Number of repair packets: 2  Genital tract inspection done: Pos     Blood Loss  Mother: Elham Marcum #[id number]   Start of Mother's Information    Delivery Blood Loss  02/13/23 0600 - 02/14/23 0518    Postpartum QBL (mL) Hospital Encounter 256 mL    Total  256 mL         End of Mother's Information  Mother: Elham Marcum #[id number]          Delivery - Provider to Complete ([id number])    Delivering clinician: Linda Grayson APRN CNM  CNM Care: Exclusive CNM care in labor  Attempted Delivery Types (Choose all that apply): Spontaneous Vaginal Delivery  Delivery Type (Choose the 1 that will go to the Birth History): Vaginal, Spontaneous                   Other personnel:  Provider Role   Kat Manning, RN Delivery Nurse   Jonel Nugent RN Delivery Assist   Yuliana Smith Student                Placenta    Date/Time: 2/14/2023  3:31 AM  Removal: Spontaneous  Comments: nuchal right hand at delivery  Disposition: Hospital disposal           Anesthesia    Method: Epidural  Cervical dilation at placement: 4-7                Presentation and Position    Presentation: Vertex    Position: Right Occiput Transverse                 I, Fina Smith, completed the PFSH and ROS. I then acted as a scribe for CNM for the remainder of the visit. - Fina Smith, RN SNM    I agree with the PFSH and ROS as completed by the SNM, except for changes made by me. The remainder of the encounter was performed by me and scribed by the SNM. The scribed note accurately reflects my personal services and decisions made by me.    I was present, gowned and gloved for the entire procedure which was scribed by the SNM.  The scribed note accurately reflects my personal services and decisions made by me.  Documentation reflects events of labor and procedures completed.     FIDENCIO Shannon CNM

## 2023-02-14 NOTE — ANESTHESIA PROCEDURE NOTES
Epidural catheter Procedure Note    Pre-Procedure   Staff -        Anesthesiologist:  David Huynh MD       Resident/Fellow: Anjel Blake MD       Performed By: resident       Location: OB       Procedure Start/Stop Times: 2/13/2023 8:23 PM and 2/13/2023 8:40 PM       Pre-Anesthestic Checklist: patient identified, IV checked, risks and benefits discussed, informed consent, monitors and equipment checked, pre-op evaluation, at physician/surgeon's request and post-op pain management  Timeout:       Correct Patient: Yes        Correct Procedure: Yes        Correct Site: Yes        Correct Position: Yes   Procedure Documentation  Procedure: epidural catheter       Diagnosis: pain control       Patient Position: sitting       Skin prep: Chloraprep       Local skin infiltrated with 2 mL of 1% lidocaine.        Insertion Site: L4-5. (midline approach).       Technique: LORT saline        NORMA at 5 cm.       Needle Type: Jammcardy needle       Needle Gauge: 17.        Needle Length (Inches): 3.5        Catheter: 19 G.          Catheter threaded easily.         5 cm epidural space.         Threaded 10 cm at skin.         # of attempts: 1 and  # of redirects:  1    Assessment/Narrative         Paresthesias: No.       Test dose of 3 mL lidocaine 1.5% w/ 1:200,000 epinephrine at 20:27 CST.         Test dose negative, 3 minutes after injection, for signs of intravascular, subdural, or intrathecal injection.       Insertion/Infusion Method: LORT saline       Aspiration negative for Heme or CSF via Epidural Catheter.    Medication(s) Administered   0.125% bupivacaine 5 mL + fentanyl 20 mcg + NS 5 mL (Epidural) (Mixture components: bupivacaine HCl (PF) 0.25 % Soln, 5 mL; fentaNYL (PF) 100 MCG/2ML Soln, 20 mcg; sodium chloride 0.9 % Soln, 5 mL) - EPIDURAL   10 mL - 2/13/2023 8:23:00 PM  Medication Administration Time: 2/13/2023 8:23 PM      FOR [facility] ([facility] ONLY:   Pain Team Contact information: please page the Pain Team Via  "[employer]. Search \"Pain\". During daytime hours, please page the attending first. At night please page the resident first.    "

## 2023-02-14 NOTE — ANESTHESIA PREPROCEDURE EVALUATION
Anesthesia Pre-Procedure Evaluation    Patient: Elham Agarwal   MRN: [id number] : 1990        Procedure : Labor epidural          Past Medical History:   Diagnosis Date     Gestational diabetes      Varicose veins of lower extremity 2008      History reviewed. No pertinent surgical history.   Allergies   Allergen Reactions     Misc. Sulfonamide Containing Compounds Dermatitis and Rash     Penicillins Dermatitis, Itching and Rash      Social History     Tobacco Use     Smoking status: Never     Smokeless tobacco: Never   Substance Use Topics     Alcohol use: Not Currently      Wt Readings from Last 1 Encounters:   23 71.7 kg (158 lb)        Anesthesia Evaluation   Pt has not had prior anesthetic         ROS/MED HX  ENT/Pulmonary:    (-) asthma   Neurologic:       Cardiovascular:    (-) PIH   METS/Exercise Tolerance:     Hematologic:     (+) no anticoagulation therapy, no thrombocytopenia,  (-) anemia   Musculoskeletal:   (+) lumbar spine     GI/Hepatic:    (-) GERD   Renal/Genitourinary:       Endo:     (+) gestational diabetes and Insulin,    Psychiatric/Substance Use:    (-) psychiatric history, alcohol abuse history and chronic opioid use history   Infectious Disease:       Malignancy:       Other:     (-) previous , twin intrauterine pregnancy and placenta previa       Physical Exam    Airway        Mallampati: III   TM distance: > 3 FB   Neck ROM: full   Mouth opening: > 3 cm    Respiratory Devices and Support         Dental  no notable dental history         Cardiovascular   cardiovascular exam normal          Pulmonary   pulmonary exam normal                OUTSIDE LABS:  CBC:   Lab Results   Component Value Date    WBC 9.1 2023    WBC 8.2 2023    HGB 13.9 2023    HGB 13.7 2023    HCT 40.4 2023    HCT 39.9 2023     2023     2023     BMP:   Lab Results   Component Value Date    GLC 88 2023    GLC 93 2023      COAGS: No results found for: PTT, INR, FIBR  POC: No results found for: BGM, HCG, HCGS  HEPATIC: No results found for: ALBUMIN, PROTTOTAL, ALT, AST, GGT, ALKPHOS, BILITOTAL, BILIDIRECT, LUIS M  OTHER:   Lab Results   Component Value Date    A1C 5.4 02/13/2023       Anesthesia Plan    ASA Status:  2, emergent    NPO Status:  ELEVATED Aspiration Risk/Unknown    Anesthesia Type: Epidural.              Consents    Anesthesia Plan(s) and associated risks, benefits, and realistic alternatives discussed. Questions answered and patient/representative(s) expressed understanding.    - Discussed:     - Discussed with:  Patient         Postoperative Care    Pain management: Neuraxial analgesia.        Comments:                Anjel Blake MD

## 2023-02-14 NOTE — PROGRESS NOTES
"Labor progress note    S:  Elham is continuing to work well through contractions. She is using nitrous with good effect through contractions. Would like to know whether her cervix is changing and is requesting an SVE.      O:  Blood pressure 119/81, pulse 68, temperature 97.7  F (36.5  C), temperature source Oral, resp. rate 16, height 1.6 m (5' 3\"), weight 71.7 kg (158 lb), last menstrual period 2022, SpO2 99 %, not currently breastfeeding.  General appearance: uncomfortable with contractions.  Contractions: Every 2-4 minutes. 60-90 seconds duration.  FHT: Baseline 125 with moderate variability. Accelerations likely present, but not traced completely. Occasional variable decelerations present. Overall, intermittent FHT due to maternal positioning and movement.  ROM: not ruptured.   Pelvic exam: 3/ 90/ Mid/ soft/ -2  Pitocin- 2 mu/min.,  Antibiotics- none      A:  IUP @ 39w4d early labor   Fetal Heart rate tracing Category category one  GBS- negative  Patient Active Problem List   Diagnosis     Dermoid cyst     High-risk pregnancy, unspecified trimester     Vitamin D deficiency     Gestational diabetes mellitus (GDM) requiring insulin     Labor and delivery, indication for care         P:  comfort measures prn; position change, counter pressure, heat, cold   Pain medication; continue to use nitrous as needed, can reassess pain management as requested by patient.   Anticipate   Labor augmentation with Pitocin     I, Fina Smith, completed the PFSH and ROS. I then acted as a scribe for CNM for the remainder of the visit. - Fina Smith RN SNM    I agree with the PFSH and ROS as completed by the SNM, except for changes made by me. The remainder of the encounter was performed by me and scribed by the SNM. The scribed note accurately reflects my personal services and decisions made by me.    FIDENCIO Shannon CNM      ADDENDUM at 1940:  S:   General appearance: uncomfortable with contractions. Pt requested " received one dose of fentanyl after last SVE and reports minimal relief. Now is requesting epidural.     O:  Contractions: Every 2-4 minutes. 60-90 seconds duration.  FHT: Baseline 125 with moderate variability with accels. Intermittent early decel/   ROM: not ruptured.   Pelvic exam: deferred   Blood sugars:   Recent Labs   Lab 23  1926 23  1358 23  1005   GLC 88 93 89     Pitocin- 6 mu/min.,  Antibiotics- none    A:  Early labor  GDMA2, glucose WNL    P:   Anesthesiologist called and notified of desire for epidural  Plan frequent position changes when epidural is in place to facilitate labor  Anticipate     FIDENCIO Shannon CNM

## 2023-02-14 NOTE — PROGRESS NOTES
Patient arrived to [facility] unit via wheelchair at 0630,with belongings, accompanied by spouse/ significant other, with infant in [facility]. Received report from MAXX Morse and checked bands. Unit and room orientation started. Call light within arms reach; no concerns present at this time. Continue with plan of care.

## 2023-02-14 NOTE — PROGRESS NOTES
Pt currently complains of increase pain and discomfort.  Pt has used Nitrous Oxide and taken Fentanyl for pain relief.  Please refer to MAR. Currently declines epidural.    Labor augmented with Pitocin.  Refer to labor record for details.     at the bedside and very supportive.  FHT's reassuring, cat1 tracing.    Hx GDM diet controlled.  BS within normal range prior to meals.  VSS. Report given to oncoming RN.

## 2023-02-15 DIAGNOSIS — D36.9 DERMOID CYST: Primary | ICD-10-CM

## 2023-02-15 PROBLEM — R03.0 ELEVATED BP WITHOUT DIAGNOSIS OF HYPERTENSION: Status: ACTIVE | Noted: 2023-02-15

## 2023-02-15 PROBLEM — O13.9 GESTATIONAL HYPERTENSION: Status: RESOLVED | Noted: 2023-02-14 | Resolved: 2023-02-15

## 2023-02-15 PROBLEM — O13.3 GESTATIONAL HYPERTENSION, THIRD TRIMESTER: Status: ACTIVE | Noted: 2023-02-15

## 2023-02-15 LAB
ALBUMIN MFR UR ELPH: 6.9 MG/DL (ref 1–14)
ALT SERPL W P-5'-P-CCNC: 11 U/L (ref 10–35)
AST SERPL W P-5'-P-CCNC: 18 U/L (ref 10–35)
CREAT SERPL-MCNC: 0.72 MG/DL (ref 0.51–0.95)
CREAT UR-MCNC: 28.6 MG/DL
ERYTHROCYTE [DISTWIDTH] IN BLOOD BY AUTOMATED COUNT: 13.8 % (ref 10–15)
GFR SERPL CREATININE-BSD FRML MDRD: >90 ML/MIN/1.73M2
GLUCOSE BLDC GLUCOMTR-MCNC: 97 MG/DL (ref 70–99)
HCT VFR BLD AUTO: 36.7 % (ref 35–47)
HGB BLD-MCNC: 11.7 G/DL (ref 11.7–15.7)
HGB BLD-MCNC: 12.1 G/DL (ref 11.7–15.7)
MCH RBC QN AUTO: 29 PG (ref 26.5–33)
MCHC RBC AUTO-ENTMCNC: 33 G/DL (ref 31.5–36.5)
MCV RBC AUTO: 88 FL (ref 78–100)
PLATELET # BLD AUTO: 227 10E3/UL (ref 150–450)
PROT/CREAT 24H UR: 0.24 MG/MG CR (ref 0–0.2)
RBC # BLD AUTO: 4.17 10E6/UL (ref 3.8–5.2)
WBC # BLD AUTO: 12.4 10E3/UL (ref 4–11)

## 2023-02-15 PROCEDURE — 84450 TRANSFERASE (AST) (SGOT): CPT | Performed by: ADVANCED PRACTICE MIDWIFE

## 2023-02-15 PROCEDURE — 250N000013 HC RX MED GY IP 250 OP 250 PS 637: Performed by: MIDWIFE

## 2023-02-15 PROCEDURE — 82565 ASSAY OF CREATININE: CPT | Performed by: ADVANCED PRACTICE MIDWIFE

## 2023-02-15 PROCEDURE — 84460 ALANINE AMINO (ALT) (SGPT): CPT | Performed by: ADVANCED PRACTICE MIDWIFE

## 2023-02-15 PROCEDURE — 84156 ASSAY OF PROTEIN URINE: CPT | Performed by: ADVANCED PRACTICE MIDWIFE

## 2023-02-15 PROCEDURE — 120N000002 HC R&B MED SURG/OB UMMC

## 2023-02-15 PROCEDURE — 36415 COLL VENOUS BLD VENIPUNCTURE: CPT | Performed by: ADVANCED PRACTICE MIDWIFE

## 2023-02-15 PROCEDURE — 85018 HEMOGLOBIN: CPT | Performed by: ADVANCED PRACTICE MIDWIFE

## 2023-02-15 PROCEDURE — 85018 HEMOGLOBIN: CPT | Performed by: MIDWIFE

## 2023-02-15 PROCEDURE — 36415 COLL VENOUS BLD VENIPUNCTURE: CPT | Performed by: MIDWIFE

## 2023-02-15 RX ORDER — LORAZEPAM 2 MG/ML
2 INJECTION INTRAMUSCULAR
Status: DISCONTINUED | OUTPATIENT
Start: 2023-02-15 | End: 2023-02-16 | Stop reason: HOSPADM

## 2023-02-15 RX ORDER — MAGNESIUM SULFATE HEPTAHYDRATE 40 MG/ML
2 INJECTION, SOLUTION INTRAVENOUS
Status: DISCONTINUED | OUTPATIENT
Start: 2023-02-15 | End: 2023-02-16 | Stop reason: HOSPADM

## 2023-02-15 RX ORDER — MAGNESIUM SULFATE HEPTAHYDRATE 40 MG/ML
4 INJECTION, SOLUTION INTRAVENOUS
Status: DISCONTINUED | OUTPATIENT
Start: 2023-02-15 | End: 2023-02-16 | Stop reason: HOSPADM

## 2023-02-15 RX ORDER — MAGNESIUM SULFATE HEPTAHYDRATE 500 MG/ML
10 INJECTION, SOLUTION INTRAMUSCULAR; INTRAVENOUS
Status: DISCONTINUED | OUTPATIENT
Start: 2023-02-15 | End: 2023-02-16 | Stop reason: HOSPADM

## 2023-02-15 RX ADMIN — DOCUSATE SODIUM 100 MG: 100 CAPSULE, LIQUID FILLED ORAL at 09:47

## 2023-02-15 ASSESSMENT — ACTIVITIES OF DAILY LIVING (ADL)
ADLS_ACUITY_SCORE: 18

## 2023-02-15 NOTE — PROGRESS NOTES
Informed by RN that Elham had an elevated BP of 124/92. Given this she now does meet the criteria for gestational hypertension. Recommended that we repeat HELLP panel. Will advise Elham to wipe well before straight catheter to avoid blood getting into the specimen (which would elevate the protein).     Pt is staying the night for additional breastfeeding/ feeding support  Will plan discharge tomorrow pending labs and baby's status    FIDENCIO Pathak, CNM

## 2023-02-15 NOTE — PLAN OF CARE
Goal Outcome Evaluation: [id number]      Plan of Care Reviewed With: patient    Overall Patient Progress: improving     Data: VSS, postpartum assessments WNL. She is voiding without difficulty, up ad ami, passing gas, eating and drinking without nausea. Perineum appears to be healing well, lochia WNL, no s/s infection. Breastfeeding and supplementing infant with moderate assistance - also discussed pumping. Patient stated she is experiencing intermittent back pain and was given Ibuprofen to intervene.  Action: Education provided on plan of care, breastfeeding and supplementing routines, and self-care techniques.  Response: Pt is agreeable with her plan of care. Positive attachment behaviors observed with infant. Support person, spouse Jaxson, present. Anticipate discharge per care plan.

## 2023-02-15 NOTE — DISCHARGE INSTRUCTIONS
Postpartum Vaginal Delivery Instructions    Activity     Ask family and friends for help when you need it.  Do not place anything in your vagina for 6 weeks.  You are not restricted on other activities, but take it easy for a few weeks to allow your body to recover from delivery.  You are able to do any activities you feel up to that point.     Call your health care provider if you have any of these symptoms:     Increased pain, swelling, redness, or fluid around your stiches from an episiotomy or perineal tear.  A fever above 100.4 F (38 C) with or without chills when placing a thermometer under your tongue.  You soak a sanitary pad with blood within 1 hour, or you see blood clots larger than a golf ball.  Bleeding that lasts more than 6 weeks.  Vaginal discharge that smells bad.  Severe pain, cramping or tenderness in your lower belly area.  A need to urinate more frequently (use the toilet more often), more urgently (use the toilet very quickly), or it burns when you urinate.  Nausea and vomiting.  Redness, swelling or pain around a vein in your leg.  Problems breastfeeding or a red or painful area on your breast.  Chest pain and cough or are gasping for air.  Problems coping with sadness, anxiety, or depression.  If you have any concerns about hurting yourself or the baby, call your provider immediately.   You have questions or concerns after you return home.     Keep your hands clean:  Always wash your hands before touching your perineal area and stitches.  This helps reduce your risk of infection.  If your hands aren't dirty, you may use an alcohol hand-rub to clean your hands. Keep your nails clean and short.                                        New Mother Care    Postpartum Appointment:  You should have your postpartum appointment six weeks after delivery.  If you had a  birth, you should have an appointment at two weeks with the physician who performed your surgery, and six weeks with the  "midwives. Call [phone] to schedule this appointment.     Lactation Appointment with CNM:   If you would like to make a clinic appointment for breastfeeding support with one of the Certified Nurse-Midwives who is also an International Board Certified Lactation Consultant, please call [phone].     Postpartum Changes:  You have experienced many physical and emotional changes during your pregnancy.  After delivery, you will notice other changes.  Here are some tips for common experiences after your baby is born.      Sign/Symptom Cause Suggestions   Mood Swings Hormonal changes, stress, fatigue Rest.  Ask for help.  Contact your health care provider if sadness continues more than two weeks, or caring for baby becomes overwhelming.   Engorged Breasts Swelling with more fluid and breast milk production two to five days after delivery.  May occur whether or not you are breastfeeding. Heat or ice for comfort.  If breastfeeding, nurse frequently.  Use supportive bra without underwire.  Should improve in one to two days.   After pains/ Cramping Cramping of uterus, often with nursing which stimulates the uterus to tighten.  Stronger with subsequent babies (second or more). Use non-aspirin pain reliever (ibuprofen or acetaminophen), especially before breastfeeding.  Empty your bladder frequently (at least every 2 hours).   Increased vaginal bleeding Too much physical activity; breastfeeding (due to uterine contractions). Rest more.  Avoid use of tampons.  Redness and amount of vaginal discharge (bleeding) decreases by three to four weeks after delivery.  Call clinic if you fill more than one pad within two hours.   Perineal discomfort and hemorrhoids Swelling from delivery; episiotomy or laceration (tearing); stitches. Ice, non-asprin pain reliever, witch hazel pads, warm tub soaks, stool softener, high fiber diet, kegel exercises. Stitches are absorbed.  Healing in two to three weeks. Do NOT use \"doughnut\" pillow for " sitting.   Increased sweating and urinating Body losing extra fluid from pregnancy; hormonal shifts. Empy bladder more often, especially before breastfeeding; increase water intake; improves within three to four days.   Constipation Change in abdominal pressure and swelling after delivery; hormonal changes. Increase fluids and fiber in diet; Metamucil or stool softner as needed.   Skin coloring  Hair Thickness  Swelling Skin darkening and pregnancy rash due to hormonal changes; extra hair growth during pregnancy; increased fluids from pregnancy and birth causes swelling. Darker skin coloring and stretch marks with fade after delivery (no treatment needed).  Extra hair will be lost in two to four months.  Pregnancy swelling resolves in one to four weeks. Continue to hydrate.   Sciatica pain Nerve irritation due to extra weight and pressure during pregnancy. May continue after delivery; heat, acetaminophen, ibuprofen, position changes for comfort.     Postpartum Exercises  These exercises may be started soon after delivery.  If you feel tired or uncomfortable, stop and try these exercises after resting.  Check for any separation in your stomach wall before doing exercises that involve twising or stress on your stomach muscles.  Place your fingers in the center of your upper abdomen and lift your head and shoulders up in a partial sit-up.  If you feel a separation in your muscle wall, it is too soon to do sit ups.  Try the following instead:  Tighten and relax your pelvic floor muscles often.  Breathe deeply while laying on your back.  As you breathe out, lift just your head up and pull the sides of your stomach toward the middle with your hands.  Then breathe in as you put your head down.  This will help to close the separation of your stomach.  Tilt your pelvis back and forth.  Alternate this with full body stretches.  Move your feet back and forth, then in circular motions.  While lying on your back, slide your  heels toward your hips and bend your knees one at a time, then together.  While lying flat on the floor, bend your knees and raise your legs one at a time.  When the stomach wall separation has closed, you can progress to straight curl-ups, diagonal curl-ups, and side leg lifts.    After a  Birth  In addition to the instruction after a vaginal delivery, follow these guidelines:  Check your incision each day for signs of infection: redness, drainage, warmth or discomfort.  Contact your midwife or OB who performed your surgery if you have any of these signs or heavy vaginal bleeding.  Check with your midwife or surgeon before taking tub baths.  You may start driving a car two weeks after delivery.  Gradually increase your physical activity and exercise level according to how comfortable or tired you feel.  During the first days after delivery, try deep breathing, bending and stretching your feet in up-and-down circular motions, extending and tightening your legs while crossed at the ankles, and doing isometric exercises while lying down.  Next, try pelvic lifts with bent knees, bending and straightening your knees separately and together.  After checking for the abdominal rectus (stomach wall) separation, advance to back arching, twisting to each side, and reaching for your knees with pillow support.  Straight curl-ups, diagonal curl-ups and side leg lifts can then be added.    Reminders  Healthy nutrition is still important for your recovery and breastfeeding.  Continue your prenatal vitamins if you are breastfeeding.  It is important for your health and your baby's health to stay smoke-free.  Babies exposed to smoke are sick more often.  Family planning is important.  Discuss birth control options with your provider.    Warning Signs  Call the on-call midwife if you have:  Heavy bleeding (filling one pad within one to two hours).  Blood clots larger than the size of a golf ball, especially with heavy  bleeding.  Vaginal discharge with foul odor or green color.  Fever (oral temperature over 100.3 F).  Signs of bladder infection (burning, frequent urination)  Signs of vaginal infection (vaginal itching, irritation)  Painful, hard lump in breast and/or red streaks with fever.  Vaginal pain or tissue coming out of vagina.  Abdominal pain or abdomen tender to the touch.  Signs of depression or anxiety, affecting sleep or activities of daily living.    If you have questions or concerns and need to speak with a midwife, please call the on-call midwife at [phone].    Thank you for sharing your birth experience with the [facility].  Congratulations on the birth of your baby!                Taking Your Blood Pressure  Blood pressure is the force of blood against the artery wall as it moves from the heart through the blood vessels. You can take your own blood pressure reading using a digital monitor. Take your readings the same each time, using the same arm. Take readings as often as your healthcare provider advises.  About blood pressure monitors  Blood pressure monitors are designed for certain ages and cases. You can find monitors for older adults, for pregnant women, and for children. Make sure the one you choose is the right one for your age and situation.  The American Heart Association advises an automatic cuff monitor that fits on your upper arm (bicep). The cuff should fit your arm size. A cuff that s too large or too small will not give an accurate reading. Measure around your upper arm to find your size.  Monitors that attach to your finger or wrist are not as accurate as monitors for your upper arm.  Ask your healthcare provider for help in choosing a monitor. Bring your monitor to your next provider visit if you need help in using it the correct way.  The steps below are general instructions for using an automatic digital monitor.  Step 1. Relax    Take your blood pressure at the same  time every day, such as in the morning or evening. Or take it at the time your healthcare provider advises.  Wait at least30 minutes after smoking, eating, or exercising. Don't drink coffee, tea, soda, or other caffeinated drinks before checking your blood pressure. If needed, use the restroom beforehand.  Sit comfortably at a table with both feet on the floor. Don't cross your legs or feet. Place the monitor near you.  Rest for a few minutes before you begin. Make sure there are no distractions. This includes TV, cell phones, and other electronics. Wait to have conversations with others until after you measure you blood pressure.  Step 2. Wrap the cuff    Place your arm on the table, palm up. Your arm should be at the level of your heart. Wrap the cuff around your upper arm, just above your elbow. It s best done on bare skin, not over clothing. Most cuffs will show you where the blood vessel in the middle of the arm at the inner side of the elbow (the brachial artery) should line up with the cuff. Look in your monitor's instruction booklet for an illustration. You can also bring your cuff to your healthcare provider and have them show you how to correctly place the cuff.  Step 3. Inflate the cuff    Push the button that starts the pump.  The cuff will tighten, then loosen.  The numbers will change. When they stop changing, your blood pressure reading will appear.  Take 2 or 3 readings 1 minute apart.  Step 4. Write down the results of each reading    Write down your blood pressure numbers for each reading. Note the date and time. Keep your results in one place, such as a notebook. Even if your monitor has a built-in memory, keep a hard copy of the readings.  Remove the cuff from your arm. Turn off the machine.  Bring your blood pressure records with you to each healthcare provider visit.  If you start a new blood pressure medicine, note the day you started the new medicine. Also note the day if you change the dose  of your medicine. Measure your blood pressure before your take your medicine. This information goes on your blood pressure recording sheet. This will help your healthcare provider check how well the medicine changes are working.  Ask your provider what numbers mean that you should call him or her. Also ask what numbers mean you should get help right away.  Mery last reviewed this educational content on 7/1/2019 2000-2021 [employer]. All rights reserved. This information is not intended as a substitute for professional medical care. Always follow your healthcare professional's instructions.

## 2023-02-15 NOTE — PROGRESS NOTES
Plan to have TVUS with 6 week pp visit to evaluate dermoid cyst.    Maria Alejandra Pappas, FIDENCIO, CNM

## 2023-02-15 NOTE — PLAN OF CARE
[id number]:  VSS, except one mildly elevated BP, ESCOBAR Pappas notified via text-page.  Postpartum assessments WDL.  Up ad ami with steady gait and independent with cares.  Bonding well with infant.  Breastfeeding on cue with assist.  Pumping and supplementing infant with donor breastmilk via SNS at breast and/or fingerfeeding.  Much education provided on waking infant to feed and limiting feeding time for SGA infant.  Pain managed, pt denied pain medication this shift.  , Jaxson present and supportive.  Will continue with postpartum cares and education per plan of care.

## 2023-02-15 NOTE — DISCHARGE SUMMARY
Addendum:   Elham will stay until 23 and discharge tomorrow  Had another elevated blood pressure, now meets criteria for Gestational Hypertension, repeat pre-eclampsia labs ordered.   Elham is also working on a feeding plan with the baby with lactation.       FIDENCIO Pathak, ESCOBAR    [facility] Discharge Summary    Elham Agarwal MRN# [id number]   Age: 33 year old YOB: 1990     Date of Admission:  2023  Date of Discharge::  02/15/23  Admitting Midwife:  FIDENCIO Edmond CNM  Discharge Midwife:  Maria Alejandra Pappas CNM, ESCOBAR, MS      Home clinic: [facility]/ [facility]          Admission Diagnoses:   Labor and delivery, indication for care [O75.9]          Discharge Diagnosis:     Normal spontaneous vaginal delivery  Intrauterine pregnancy at 39/5 weeks gestation  First degree perineal laceration  GDMA2   Elevated blood pressure with out diagnosis of hypertension  Dermoid cyst needs pp US          Procedures:     Procedure(s): , Epidural                Medications Prior to Admission:     Medications Prior to Admission   Medication Sig Dispense Refill Last Dose     Prenatal Vit-Fe Fumarate-FA (PRENATAL MULTIVITAMIN W/IRON) 27-0.8 MG tablet Take 1 tablet by mouth daily   2023 at 0800     blood glucose monitoring (ONE TOUCH ULTRA 2) meter device kit         [DISCONTINUED] acetone urine (KETOSTIX) test strip Check urine ketones when you wake up every morning for 7 days. If negative everyday, reduce testing to once a week. 50 strip 1      [DISCONTINUED] blood glucose (NO BRAND SPECIFIED) lancets standard Use to test blood sugar 4 times daily or as directed. 1 each 3      [DISCONTINUED] blood glucose (NO BRAND SPECIFIED) test strip Use to test blood sugar 4 times daily or as directed. 500 strip 3      [DISCONTINUED] blood glucose monitoring (NO BRAND SPECIFIED) meter device kit Use to test blood sugar 4 times daily or as  directed. 1 kit 0      [DISCONTINUED] insulin detemir (LEVEMIR PEN) 100 UNIT/ML pen Inject 12 Units Subcutaneous At Bedtime 15 mL 1 2023     [DISCONTINUED] insulin pen needle (32G X 4 MM) 32G X 4 MM miscellaneous Use 1 pen needles daily or as directed. 50 each 1      [DISCONTINUED] Lancets (ONETOUCH DELICA PLUS [id number]) MISC                 Discharge Medications:     Current Discharge Medication List      START taking these medications    Details   acetaminophen (TYLENOL) 325 MG tablet Take 2 tablets (650 mg) by mouth every 6 hours as needed for mild pain Start after Delivery.  Qty: 100 tablet, Refills: 0    Associated Diagnoses:  (normal spontaneous vaginal delivery)      ibuprofen (ADVIL/MOTRIN) 600 MG tablet Take 1 tablet (600 mg) by mouth every 6 hours as needed for moderate pain (4-6) Start after delivery  Qty: 60 tablet, Refills: 0    Associated Diagnoses:  (normal spontaneous vaginal delivery)      senna-docusate (SENOKOT-S/PERICOLACE) 8.6-50 MG tablet Take 1 tablet by mouth daily Start after delivery.  Qty: 100 tablet, Refills: 0    Associated Diagnoses:  (normal spontaneous vaginal delivery)         CONTINUE these medications which have NOT CHANGED    Details   Prenatal Vit-Fe Fumarate-FA (PRENATAL MULTIVITAMIN W/IRON) 27-0.8 MG tablet Take 1 tablet by mouth daily      blood glucose monitoring (ONE TOUCH ULTRA 2) meter device kit          STOP taking these medications       acetone urine (KETOSTIX) test strip Comments:   Reason for Stopping:         blood glucose (NO BRAND SPECIFIED) lancets standard Comments:   Reason for Stopping:         blood glucose (NO BRAND SPECIFIED) test strip Comments:   Reason for Stopping:         blood glucose monitoring (NO BRAND SPECIFIED) meter device kit Comments:   Reason for Stopping:         insulin detemir (LEVEMIR PEN) 100 UNIT/ML pen Comments:   Reason for Stopping:         insulin pen needle (32G X 4 MM) 32G X 4 MM miscellaneous Comments:   Reason  for Stopping:         Lancets (ONETOUCH DELICA PLUS [id number]) MISC Comments:   Reason for Stopping:                     Consultations:   No consultations were requested during this admission          Brief History of Labor:   Elham Agarwal is a 33 year old   at  39w5d presented to labor floor on 23 in early labor, working through contractions. She continued to make slow change through the day and utilized the birth ball, position changes, ambulation, and massage from Jaxson. Pitocin initiated to improve contraction pattern at 1500. Elham used N2O, had one dose of fentanyl and then received an epidural for pain management.  No patient reported or observed amniotic fluid throughout the day. Progressed to complete and +1 on 23 at 0315. Pushing initiated at 0320. Brought infant to small crown with first push and room was prepared for delivery. FHR with variable and late decels prior to delivery, periods of minimal and moderate variability throughout labor. Head delivered MOA and restituted to ROT . Nuchal cord x1, right nuchal hand. Shoulders easily delivered under maternal effort. Gush of meconium stained fluid following delivery of infant.  Live female infant delivered at 0324. Spontaneous breath, vigorous cry, well flexed, HR>100. Infant directly to maternal abdomen, skin to skin. Delayed cord clamping until cessation of pulsation, then clamped x2 and cut by JS Puri.   30 units of pitocin infusing after baby.  Cord blood obtained for typing. Cord segment for peds. Intact placenta spontaneously delivered via schultze at 0331.   3 vessel cord. Fundus firm @ one above after fundal massage.   Vagina, perineum, and rectum inspected, perineum intact, small vaginal laceration at 5 o'clock position near introitus was repaired with 3-0 vicryl using a figure of x stitch to obtain hemostasis. Bilateral periurethral lacerations present, left repaired using 4-0 vicryl with figure of x stich. Hemostasis  noted.      Mother and infant stable, continued skin to skin.     Apgars 8/9.  Weight [id number].       Delivery Note     IUP at 39 weeks 5 days gestation delivered on 2023.     delivery of a viable Female infant.  Weight : 5 pounds 12 ounces   Apgars of 8 at 1 minute and 9 at 5 minutes.  Labor was augmented.  Medications administered  in labor:  Pain Rx Nitrous oxide and Epidural; Antibiotics No;   Perineum: Periurethral laceration and Vaginal / Sulcus  Placenta-mechanism: spontaneous, intact,  with a 3 vessel cord. IV oxytocin was given.  Quantitative Blood Loss was 256 mL.  Complications of pregnancy, labor and delivery: None  Birth attendants  FIDENCIO Shannon, CNM; Fina Smith RN SNM        Assessment Day of Discharge      Pt stable, baby is rooming in  Breast feeding status: Elham is currently working on feeding the baby, working with lactation, baby getting donor milk, will pump  Complications since 2 hours post delivery: None  Patient is tolerating acitivity well Voiding without difficulty, cramping is minimal, lochia is decreasing and patient denies clots.  Perineal pain is is minimal.    postpartum exam   Breasts:soft, filling, no colostrum noted with hand expression  Nipples: flat nipples bilaterally, erect easily with manipulation, no lesions, intact  Abdomen: soft, nontender, fundus firm, umb/-1, midline  Perineum:  laceration is well approximated, healing well, approximated, no edema, erythema, bruising, hematoma or s/s of infection  Lochia: min rubra, no clots, no odor  Legs: nontender, trace edema           Hospital Course:     Elham Agarwal feels ready for discharge. She is up and active in the room independently, is voiding without difficulty. Ambulating without dizziness or calf pain. Tolerating normal diet and passing flatus. Has had a small BM. Voiding without issue.  Pain is well controlled with current medications of ibuprofen and tylenol. Denies HA, visual  "changes, RUQ pain  LOCHIA: Bleeding is light without clots.  INFANT FEEDING: Baby \"Jackie\" is small, 5lbs 12 oz and is struggling to have a deeper latch. Currently working with lactation to evaluate ways to feed baby and keep milk supply up.   PP CONTRACEPTION: plans include condoms.   PP SUPPORT:  Elham Agarwal plans to stay home with the baby. Jaxson only gets today off of work. Elham's mother and MIL both will help with the first few weeks. Elham feels like she has adequate support postpartum.   MOOD ASSESSMENT: She denies any history of depression/anxiety.   COMPLICATIONS SINCE GIVING BIRTH: breastfeeding difficulties    Post-partum hemoglobin:   Hemoglobin   Date Value Ref Range Status   02/15/2023 11.7 11.7 - 15.7 g/dL Final        ASSESSMENT/PLAN:  34 yo   Day 1 postpartum, NSVB 23  s/p vaginal laceration, well approximated.  Vital signs stable   Breastfeeding initiated, baby receiving donor breast milk  No history of PP depression or anxiety  Planning Condoms for pp contraception  Complications:breastfeeding difficulties, hx of gestational diabetes, plan for 2 hour OGTT at 6 wks postpartum and elevated blood pressure without diagnosis of HTN  Plan d/c home today, pending baby is on a good plan for feeding/follow up  RTC 2 and 6 weeks  Teaching done: D/C Instructions: Nutrition/Activity, Engorgement Management, Birth Control Options, Warning Signs/When to Call: Excessive Bleeding, Infection, PP Depression, Kegals and Crunches, RTC Clinic for PP Appointment and PNV  Elevated BP without diagnosis of HTN: Reviewed with Elham that she did not meet the criteria for Gestational hypertension with her elevated blood pressures only lasting one hour from 0699-7354, all following Bps normal. Pre-e labs normal, though P/C ratio was not sent  -Carefully reviewed s/sx of postpartum preeclampsia and warning signs  Breastfeeding difficulties: Reviewed importance of pumping her breasts every 2-3 hours to " promote milk production if baby is not able to nurse well from the breast. Reviewed options for outpatient lactation support  GDMA2: Normal blood sugars for 24 hours after delivery. Recommended 2 hour GTT at 6 weeks  Dermoid cyst: Recommended repeat US with 6 week pp visit, will place orders now so pt can do with 6 week pp visit.   Postpartum warning s/s reviewed, including bleeding/clots, fever, mastitis, or depression/anxiety           Discharge Instructions and Follow-Up:     Discharge diet: Regular   Discharge activity: Activity as tolerated   Discharge follow-up: Follow up with JIM Maxwell in 2 and 6 weeks   Wound care: Warm baths           Discharge Disposition:     Discharged to home        Maria Alejandra Pappas CNM

## 2023-02-15 NOTE — PLAN OF CARE
Goal Outcome Evaluation:    VSS. Denies chest pain, SOB, nausea, pre-e sx. PP assessments WDL. Pt is up ad ami and voiding independently. Breastfeeding with assistance d/t sleepy and disinterested baby. Also supplementing baby with DBM via fingerfeeding and SNS at the breast. , Jaxson, is present and supportive. Great bonding observed with baby girl. Anticipate discharge to home this evening vs tomorrow.     Continue to assess and assist as needed per POC.

## 2023-02-15 NOTE — PROVIDER NOTIFICATION
02/15/23 1621   Provider Notification   Provider Name/Title ESCOBAR Pappas   Method of Notification Electronic Page   Request Evaluate-Remote   Notification Reason Vital Signs Change   FYI: Pt CT=171-64.    Provider returned page at 7396 and ordered:  HELLP labs, daily weights (beginning now), Urine Protein, Strict I&O.

## 2023-02-15 NOTE — LACTATION NOTE
This note was copied from a baby's chart.  Consult for: SGA baby, first time breastfeeding     Delivery Information: Baby Jackie was born at 39.5 weeks via vaginal delivery on 23 at 0324.    Maternal Health History: lEham has a history of GDM (insulin dependent), ghtn and a dermoid cyst.    She noted breast growth and sensitivity in early pregnancy. She denies any history of breast/chest injury or surgery. She has been pumping. ?  ?  Infant information: Jackie was SGA at birth at 5lb 12.4 oz. She has age appropriate output and weight loss. ?    Weight Change Since Birth: -2%     Oral exam of baby:  Normal oral exam. Able to produce sustained, coordinated suck on my finger.    Feeding Assessment:  Elham just finished a feeding with the Resource RN, Rosy. They shared that infant was alert at the breast and was able to finger feed and take full 10ml supplement of donor milk.    Elham has been pumping due to SGA baby and need for supplemental milk.       Education: early feeding feeding frequency, waking baby for feedings due to SGA, satiety cues, expected  output,  weight loss, nutritive vs non-nutritive sucking,  benefits of breast massage and hand expression of colostrum, supplement recommendations, pumping recommendations, inpatient lactation support and outpatient lactation resources.       Plan: Continue breastfeeding on cue with RN support as needed with a goal of 8-12 feedings per day. Encourage frequent skin to skin, breast massage and hand expression with each feeding. Elham was encouraged to continue pumping until her milk is in and infant is gaining weight.    Parents were given information about outpatient donor milk. They are unsure if they will purchase donor milk or use formula for supplementation at home.    Elham does not have a pump to use at home. She was given information about the pumps available from the hospital.     Encouraged follow up with outpatient LC within 1 week of  discharge due to SGA baby and need for supplementation.      Parvin Nixon RN, IBCLC  Lactation Consultant  Ascom: [id number]  Office: [phone]

## 2023-02-16 VITALS
RESPIRATION RATE: 16 BRPM | HEIGHT: 63 IN | OXYGEN SATURATION: 98 % | TEMPERATURE: 98 F | WEIGHT: 152 LBS | HEART RATE: 74 BPM | SYSTOLIC BLOOD PRESSURE: 127 MMHG | BODY MASS INDEX: 26.93 KG/M2 | DIASTOLIC BLOOD PRESSURE: 92 MMHG

## 2023-02-16 DIAGNOSIS — O13.3 GESTATIONAL HYPERTENSION, THIRD TRIMESTER: Primary | ICD-10-CM

## 2023-02-16 PROCEDURE — 250N000013 HC RX MED GY IP 250 OP 250 PS 637: Performed by: MIDWIFE

## 2023-02-16 PROCEDURE — 250N000013 HC RX MED GY IP 250 OP 250 PS 637: Performed by: ADVANCED PRACTICE MIDWIFE

## 2023-02-16 RX ORDER — NEBULIZER AND COMPRESSOR
1 EACH MISCELLANEOUS 2 TIMES DAILY
Qty: 1 KIT | Refills: 0 | Status: SHIPPED | OUTPATIENT
Start: 2023-02-16 | End: 2023-03-31

## 2023-02-16 RX ORDER — NIFEDIPINE 30 MG
30 TABLET, EXTENDED RELEASE ORAL DAILY
Qty: 30 TABLET | Refills: 1 | Status: SHIPPED | OUTPATIENT
Start: 2023-02-16 | End: 2023-03-31

## 2023-02-16 RX ORDER — NIFEDIPINE 30 MG/1
30 TABLET, EXTENDED RELEASE ORAL DAILY
Status: DISCONTINUED | OUTPATIENT
Start: 2023-02-16 | End: 2023-02-16 | Stop reason: HOSPADM

## 2023-02-16 RX ADMIN — ACETAMINOPHEN 650 MG: 325 TABLET ORAL at 03:01

## 2023-02-16 RX ADMIN — DOCUSATE SODIUM 100 MG: 100 CAPSULE, LIQUID FILLED ORAL at 08:52

## 2023-02-16 RX ADMIN — IBUPROFEN 800 MG: 800 TABLET, FILM COATED ORAL at 03:01

## 2023-02-16 RX ADMIN — NIFEDIPINE 30 MG: 30 TABLET, FILM COATED, EXTENDED RELEASE ORAL at 10:45

## 2023-02-16 ASSESSMENT — ACTIVITIES OF DAILY LIVING (ADL)
ADLS_ACUITY_SCORE: 18

## 2023-02-16 NOTE — PLAN OF CARE
Goal Outcome Evaluation:       Vital signs and postpartum assessments within normal limits. Fundus is midline, firm without message, and 1 cm below umbilicus. Lochia is scant. Voiding spontaneously without difficulty, intake and output adequate. Denies symptoms of preeclampsia, brachioradial and patellar reflexes are present, 2 average, no clonus.  Pain is adequately managed with tylenol and ibuprofen. Breastfeeding and bottle feeding donor milk. Bonding well with . Continue with education and  plan of care.

## 2023-02-16 NOTE — DISCHARGE SUMMARY
[facility] Discharge Summary    Elham Agarwal MRN# [id number]   Age: 33 year old YOB: 1990     Date of Admission:  2/13/2023  Date of Discharge::  2/16/2023  Admitting Physician:  FIDENCIO Edmond CNM  Discharge Physician:  FIDENCIO Nieto CNM      Home clinic: [facility]          Admission Diagnoses:   Labor and delivery, indication for care [O75.9]          Discharge Diagnosis:     Normal spontaneous vaginal delivery  Intrauterine pregnancy at 39 weeks gestation          Procedures:     Procedure(s): Repair of second degree perineal laceration       No other significant procedures performed during this admission           Medications Prior to Admission:     Medications Prior to Admission   Medication Sig Dispense Refill Last Dose     Prenatal Vit-Fe Fumarate-FA (PRENATAL MULTIVITAMIN W/IRON) 27-0.8 MG tablet Take 1 tablet by mouth daily   2/12/2023 at 0800     blood glucose monitoring (ONE TOUCH ULTRA 2) meter device kit         [DISCONTINUED] acetone urine (KETOSTIX) test strip Check urine ketones when you wake up every morning for 7 days. If negative everyday, reduce testing to once a week. 50 strip 1      [DISCONTINUED] blood glucose (NO BRAND SPECIFIED) lancets standard Use to test blood sugar 4 times daily or as directed. 1 each 3      [DISCONTINUED] blood glucose (NO BRAND SPECIFIED) test strip Use to test blood sugar 4 times daily or as directed. 500 strip 3      [DISCONTINUED] blood glucose monitoring (NO BRAND SPECIFIED) meter device kit Use to test blood sugar 4 times daily or as directed. 1 kit 0      [DISCONTINUED] insulin detemir (LEVEMIR PEN) 100 UNIT/ML pen Inject 12 Units Subcutaneous At Bedtime 15 mL 1 2/12/2023     [DISCONTINUED] insulin pen needle (32G X 4 MM) 32G X 4 MM miscellaneous Use 1 pen needles daily or as directed. 50 each 1      [DISCONTINUED] Lancets (ONETOUCH DELICA PLUS [id number]) MISC                 Discharge Medications:      Current Discharge Medication List      START taking these medications    Details   acetaminophen (TYLENOL) 325 MG tablet Take 2 tablets (650 mg) by mouth every 6 hours as needed for mild pain Start after Delivery.  Qty: 100 tablet, Refills: 0    Associated Diagnoses:  (normal spontaneous vaginal delivery)      ibuprofen (ADVIL/MOTRIN) 600 MG tablet Take 1 tablet (600 mg) by mouth every 6 hours as needed for moderate pain (4-6) Start after delivery  Qty: 60 tablet, Refills: 0    Associated Diagnoses:  (normal spontaneous vaginal delivery)      NIFEdipine ER OSMOTIC (ADALAT CC) 30 MG 24 hr tablet Take 1 tablet (30 mg) by mouth daily  Qty: 30 tablet, Refills: 1    Associated Diagnoses: Gestational hypertension, third trimester      senna-docusate (SENOKOT-S/PERICOLACE) 8.6-50 MG tablet Take 1 tablet by mouth daily Start after delivery.  Qty: 100 tablet, Refills: 0    Associated Diagnoses:  (normal spontaneous vaginal delivery)         CONTINUE these medications which have NOT CHANGED    Details   Prenatal Vit-Fe Fumarate-FA (PRENATAL MULTIVITAMIN W/IRON) 27-0.8 MG tablet Take 1 tablet by mouth daily      blood glucose monitoring (ONE TOUCH ULTRA 2) meter device kit          STOP taking these medications       acetone urine (KETOSTIX) test strip Comments:   Reason for Stopping:         blood glucose (NO BRAND SPECIFIED) lancets standard Comments:   Reason for Stopping:         blood glucose (NO BRAND SPECIFIED) test strip Comments:   Reason for Stopping:         blood glucose monitoring (NO BRAND SPECIFIED) meter device kit Comments:   Reason for Stopping:         insulin detemir (LEVEMIR PEN) 100 UNIT/ML pen Comments:   Reason for Stopping:         insulin pen needle (32G X 4 MM) 32G X 4 MM miscellaneous Comments:   Reason for Stopping:         Lancets (ONETOUCH DELICA PLUS [id number]) MISC Comments:   Reason for Stopping:                     Consultations:   No consultations were requested during this  admission          Brief History of Labor:      Elham Agarwal is a 33 year old   at  39w5d presented to labor floor on 23 in early labor, working through contractions. She continued to make slow change through the day and utilized the birth ball, position changes, ambulation, and massage from Jaxson. Pitocin initiated to improve contraction pattern at 1500. Elham used N2O, had one dose of fentanyl and then received an epidural for pain management.  No patient reported or observed amniotic fluid throughout the day. Progressed to complete and +1 on 23 at 0315. Pushing initiated at 0320. Brought infant to small crown with first push and room was prepared for delivery. FHR with variable and late decels prior to delivery, periods of minimal and moderate variability throughout labor. Head delivered MOA and restituted to ROT . Nuchal cord x1, right nuchal hand. Shoulders easily delivered under maternal effort. Gush of meconium stained fluid following delivery of infant.  Live female infant delivered at 0324. Spontaneous breath, vigorous cry, well flexed, HR>100. Infant directly to maternal abdomen, skin to skin. Delayed cord clamping until cessation of pulsation, then clamped x2 and cut by FOB Jaxson.   30 units of pitocin infusing after baby.  Cord blood obtained for typing. Cord segment for peds. Intact placenta spontaneously delivered via schultze at 0331.   3 vessel cord. Fundus firm @ one above after fundal massage.   Vagina, perineum, and rectum inspected, perineum intact, small vaginal laceration at 5 o'clock position near introitus was repaired with 3-0 vicryl using a figure of x stitch to obtain hemostasis. Bilateral periurethral lacerations present, left repaired using 4-0 vicryl with figure of x stich. Hemostasis noted.      Mother and infant stable, continued skin to skin.     Apgars 8/9.  Weight [id number].       Delivery Note     IUP at 39 weeks 5 days gestation delivered on February  "2023.     delivery of a viable Female infant.  Weight : 5 pounds 12 ounces   Apgars of 8 at 1 minute and 9 at 5 minutes.  Labor was augmented.  Medications administered  in labor:  Pain Rx Nitrous oxide and Epidural; Antibiotics No;   Perineum: Periurethral laceration and Vaginal / Sulcus  Placenta-mechanism: spontaneous, intact,  with a 3 vessel cord. IV oxytocin was given.  Quantitative Blood Loss was 256 mL.  Complications of pregnancy, labor and delivery: None  Birth attendants  FIDENCIO Shannon, JIMM; Fina Smith, RN SNM     Assessment Day of Discharge    Vital signs:  Temp: 98  F (36.7  C) Temp src: Oral BP: (!) 120/93 Pulse: 57   Resp: 16   O2 Device: None (Room air)   Height: 160 cm (5' 3\") Weight: 68.9 kg (152 lb)  Estimated body mass index is 26.93 kg/m  as calculated from the following:    Height as of this encounter: 1.6 m (5' 3\").    Weight as of this encounter: 68.9 kg (152 lb).      Breasts: Soft, filling  Nipples: Intact, Non-tender  Abdomen: Soft, Non-tender    Uterus: Fundus Firm, Non-tender, located -1 below the umbilicus   Lochia: Rubra, appropriate amount    Perineum:  Well-approximated, healing well  Lower Extremities: trace Edema Bilateral, Negative Radha's Sign           Hospital Course:   The patient's hospital course was unremarkable.  On discharge, pain was well controlled. Vaginal bleeding is similar to peak menstrual flow.  Voiding without difficulty.  Ambulating well and tolerating a normal diet.  No fever.  Breastfeeding well.  Infant is stable.  Mood stable, has strong family supports identified.   Elham Agarwal was discharged on post-partum day #2.    Post-partum hemoglobin:   Hemoglobin   Date Value Ref Range Status   02/15/2023 12.1 11.7 - 15.7 g/dL Final        Rh: positive  Rubella status:immune  Plan for contraception: condoms  Reviewed Chapter One of  [facility]  Family Book including warning signs of postpartum, activity level, avoiding IC for 6 weeks, Tub " soaks BID, Kegels, abdominal exercises, breast care,  postpartum depression/anxiety.  Reviewed how to establish/maintain milk supply, nipple care, pumping for storage, fore/hind milk, wet/dirty diapers to expect each day, resources prn if questions or concerns.  Pt verbalized understanding with teach back.          Discharge Instructions and Follow-Up:     Discharge diet: Regular, Iron Rich, High Fiber, Minimum 80oz water daily   Discharge activity: Pelvic rest: abstain from intercourse and do not use tampons for 6 week(s)   Discharge follow-up: Follow up with RN in 5-6 days  Follow up with CNM in 2 and 6 weeks  Once or twice daily blood pressure monitoring at home   Wound care: Drink plenty of fluids  Avoid constipation              Discharge Disposition:     Discharged to home          FIDENCIO Nieto CNM

## 2023-02-16 NOTE — PROVIDER NOTIFICATION
02/16/23 0913   Provider Notification   Provider Name/Title Kalyani Morocho CNM   Method of Notification Electronic Page   Request Evaluate-Remote   Notification Reason Vital Signs Change   PT BP this morning 140/104.  Pt reports some tightness/swelling in hands in the mornings.  Denies headache, vision changes, RUQ pain.  ESCOBAR Morocho called back and asked to repeat BP now (120/93).  She will come see pt shortly.

## 2023-02-16 NOTE — PLAN OF CARE
VSS, except for BP elevated though not severe range, pt started on nifedipine today and discharged with nifedipine.  Postpartum assessments WDL.  Pt denies headache, vision changes, RUQ, SOB, provided education on preeclampsia and to call provider if theses symptoms develop.  Up ad ami with steady gait and independent with cares.  Bonding well with infant.  Breastfeeding on cue independently with good latch for 15 minutes, then pumping (starting to get a couple drops of colostrum) and family fingerfeeding formula (changed from DBM as they will use formula at home), infant taking 20mls.  Pt denies pain and declined pain medication.  Mother and mother-in-law present and supportive.  EDS=2.  GWN videos watched.  Breast pump and home blood pressure cuff provided, reviewed usage with pt.  Reviewed discharge medications.  Reviewed follow-up for appointments for Monday (BP check), 2 weeks postpartum and 6 weeks postpartum.  Reviewed discharge instructions and answered all questions.  Discharged home with infant and all belongings at 1630.

## 2023-02-20 ENCOUNTER — ALLIED HEALTH/NURSE VISIT (OUTPATIENT)
Dept: OBGYN | Facility: CLINIC | Age: 33
End: 2023-02-20
Payer: COMMERCIAL

## 2023-02-20 VITALS — SYSTOLIC BLOOD PRESSURE: 125 MMHG | DIASTOLIC BLOOD PRESSURE: 89 MMHG

## 2023-02-20 NOTE — PROGRESS NOTES
Patient here for BP check 1 week PP.  On nifedipine 30mg daily, taking this in the AM and then checking BP in the afternoon.      Slight headache on Friday, resolved with Tylenol.  Denies RUQ pain, vision changes.      Home BP ranging 109/77 to 134/98.      In clinic average 125/89.      Patient has 2 week PP and 6 week PP visits scheduled.      Instructed to continue taking nifedipine and BP daily, and to call if concerns.

## 2023-03-03 ENCOUNTER — OFFICE VISIT (OUTPATIENT)
Dept: OBGYN | Facility: CLINIC | Age: 33
End: 2023-03-03
Attending: ADVANCED PRACTICE MIDWIFE
Payer: COMMERCIAL

## 2023-03-03 VITALS
BODY MASS INDEX: 25.57 KG/M2 | DIASTOLIC BLOOD PRESSURE: 82 MMHG | WEIGHT: 144.3 LBS | SYSTOLIC BLOOD PRESSURE: 113 MMHG | HEIGHT: 63 IN

## 2023-03-03 DIAGNOSIS — O24.414 GESTATIONAL DIABETES MELLITUS (GDM) REQUIRING INSULIN: ICD-10-CM

## 2023-03-03 PROCEDURE — 99207 PR POST PARTUM EXAM: CPT | Performed by: ADVANCED PRACTICE MIDWIFE

## 2023-03-03 PROCEDURE — G0463 HOSPITAL OUTPT CLINIC VISIT: HCPCS | Performed by: ADVANCED PRACTICE MIDWIFE

## 2023-03-03 NOTE — LETTER
Date:March 6, 2023      Provider requested that no letter be sent. Do not send.       [facility]

## 2023-03-03 NOTE — PROGRESS NOTES
"SUBJECTIVE  33 year old  presents for 2 week post partum visit s/p  delivery on 2023 for blood pressure check.  Pt is doing well. Breastfeeding with good latch to both breasts. No nipple pain. Lochia Coping well with support from .   Has been checking blood pressure at home all normotensive on Nifedipine.      OBJECTIVE  General- no apparent distress  Reports no concerns with breasts or perineum    /82   Ht 1.6 m (5' 3\")   Wt 65.5 kg (144 lb 4.8 oz)   LMP 2022 (Exact Date)   Breastfeeding Yes   BMI 25.56 kg/m        ASSESSMENT/PLAN  2 weeks postpartum s/p  delivery complicated by GDM and GHTN  - No signs or symptoms of PPD. Breastfeeding exclusively   -Discontinue Nifedipine at this time, check blood pressure twice daily.  Call if it is elevated  - RTO in 4 weeks for 6 week check and prn if questions or concerns, plan 2hr glucose at that visit.  FIDENCIO NietoM    "

## 2023-03-03 NOTE — LETTER
"3/3/2023       RE: Elham Agarwal  [address]  [facility] [address]     Dear Colleague,    Thank you for referring your patient, Elham Agarwal, to the [facility] [place] at [facility]. Please see a copy of my visit note below.    SUBJECTIVE  33 year old  presents for 2 week post partum visit s/p  delivery on 2023 for blood pressure check.  Pt is doing well. Breastfeeding with good latch to both breasts. No nipple pain. Lochia Coping well with support from .   Has been checking blood pressure at home all normotensive on Nifedipine.      OBJECTIVE  General- no apparent distress  Reports no concerns with breasts or perineum    /82   Ht 1.6 m (5' 3\")   Wt 65.5 kg (144 lb 4.8 oz)   LMP 2022 (Exact Date)   Breastfeeding Yes   BMI 25.56 kg/m        ASSESSMENT/PLAN  2 weeks postpartum s/p  delivery complicated by GDM and GHTN  - No signs or symptoms of PPD. Breastfeeding exclusively   -Discontinue Nifedipine at this time, check blood pressure twice daily.  Call if it is elevated  - RTO in 4 weeks for 6 week check and prn if questions or concerns, plan 2hr glucose at that visit.  FIDENCIO Nieto CNM        Again, thank you for allowing me to participate in the care of your patient.      Sincerely,    FIDENCIO Nieto CNM      "

## 2023-03-03 NOTE — PATIENT INSTRUCTIONS
Thank you for trusting us with your care!     If you need to contact us for questions about:  Symptoms, Scheduling & Medical Questions; Non-urgent (2-3 day response) Cari message, Urgent (needing response today) [phone] (if after 3:30pm next day response)   Prescriptions: Please call your Pharmacy   Billing: Danish [phone] or [facility]:[phone]

## 2023-03-31 ENCOUNTER — TELEPHONE (OUTPATIENT)
Dept: OBGYN | Facility: CLINIC | Age: 33
End: 2023-03-31

## 2023-03-31 ENCOUNTER — OFFICE VISIT (OUTPATIENT)
Dept: OBGYN | Facility: CLINIC | Age: 33
End: 2023-03-31
Attending: ADVANCED PRACTICE MIDWIFE
Payer: COMMERCIAL

## 2023-03-31 ENCOUNTER — LAB (OUTPATIENT)
Dept: LAB | Facility: CLINIC | Age: 33
End: 2023-03-31
Attending: ADVANCED PRACTICE MIDWIFE
Payer: COMMERCIAL

## 2023-03-31 VITALS
HEART RATE: 75 BPM | HEIGHT: 63 IN | SYSTOLIC BLOOD PRESSURE: 107 MMHG | DIASTOLIC BLOOD PRESSURE: 73 MMHG | BODY MASS INDEX: 24.45 KG/M2 | WEIGHT: 138 LBS

## 2023-03-31 DIAGNOSIS — O24.414 GESTATIONAL DIABETES MELLITUS (GDM) REQUIRING INSULIN: ICD-10-CM

## 2023-03-31 DIAGNOSIS — D36.9 DERMOID CYST: ICD-10-CM

## 2023-03-31 DIAGNOSIS — O24.414 GESTATIONAL DIABETES MELLITUS (GDM) REQUIRING INSULIN: Primary | ICD-10-CM

## 2023-03-31 PROCEDURE — G0463 HOSPITAL OUTPT CLINIC VISIT: HCPCS | Performed by: ADVANCED PRACTICE MIDWIFE

## 2023-03-31 PROCEDURE — 99207 PR POST PARTUM EXAM: CPT | Performed by: ADVANCED PRACTICE MIDWIFE

## 2023-03-31 RX ORDER — FAMOTIDINE 20 MG
TABLET ORAL
COMMUNITY

## 2023-03-31 ASSESSMENT — ANXIETY QUESTIONNAIRES
GAD7 TOTAL SCORE: 0
6. BECOMING EASILY ANNOYED OR IRRITABLE: NOT AT ALL
1. FEELING NERVOUS, ANXIOUS, OR ON EDGE: NOT AT ALL
5. BEING SO RESTLESS THAT IT IS HARD TO SIT STILL: NOT AT ALL
3. WORRYING TOO MUCH ABOUT DIFFERENT THINGS: NOT AT ALL
2. NOT BEING ABLE TO STOP OR CONTROL WORRYING: NOT AT ALL
IF YOU CHECKED OFF ANY PROBLEMS ON THIS QUESTIONNAIRE, HOW DIFFICULT HAVE THESE PROBLEMS MADE IT FOR YOU TO DO YOUR WORK, TAKE CARE OF THINGS AT HOME, OR GET ALONG WITH OTHER PEOPLE: NOT DIFFICULT AT ALL
GAD7 TOTAL SCORE: 0
7. FEELING AFRAID AS IF SOMETHING AWFUL MIGHT HAPPEN: NOT AT ALL

## 2023-03-31 ASSESSMENT — PATIENT HEALTH QUESTIONNAIRE - PHQ9
5. POOR APPETITE OR OVEREATING: NOT AT ALL
SUM OF ALL RESPONSES TO PHQ QUESTIONS 1-9: 0

## 2023-03-31 ASSESSMENT — PAIN SCALES - GENERAL: PAINLEVEL: NO PAIN (0)

## 2023-03-31 NOTE — PATIENT INSTRUCTIONS
Thank you for trusting us with your care!     If you need to contact us for questions about:  Symptoms, Scheduling & Medical Questions; Non-urgent (2-3 day response) Cari message, Urgent (needing response today) [phone] (if after 3:30pm next day response)   Prescriptions: Please call your Pharmacy   Billing: Danish [phone] or [facility]:[phone]

## 2023-03-31 NOTE — NURSING NOTE
SUBJECTIVE:   Elham Agarwal is here for her 6-week postpartum checkup.     PHQ-9 score:   Hx of Abuse:  No    Delivery Date: 23.    Delivering provider:  Linda Grayson.    Type of delivery:  .  Perineum:  in tact.     Delivery complications: None  Infant gender:  Girl Jackie, weight 5 pounds 16 oz.  Feeding Method:   and Bottlefed.  Complications reported with feeding:  none, infant thriving .    Bleeding:  None.  Duration:  4 weeks.  Menses resumed:  No  Bowel/Urinary problems:  No    Contraception Planned:  condoms  She  has not had intercourse since delivery..

## 2023-03-31 NOTE — TELEPHONE ENCOUNTER
Lab called- patient here for 2 hour glucose screen, but  did not draw a fasting glucose prior to giving patient the sugar beverage.  They are instructing patient to return on another day for another lab.  Ordered repeat lab.

## 2023-03-31 NOTE — LETTER
"3/31/2023       RE: Elham Agarwal  [address]  [facility] [address]     Dear Colleague,    Thank you for referring your patient, Elham Agarwal, to the [facility] [place] at [facility]. Please see a copy of my visit note below.    Elham Agarwal is a 33 year old female who is 6 weeks postpartum following a NSVB.    The delivery was at 39 weeks gestation   Her baby girl  weighed 5 lbs 12 oz at birth.     Postpartum course has been complicated by Gestational Hypertension in the postpartum period that has resolved; need for 2 hour GTT d/t GDMa2.   Baby is feeding by Breast and bottle with EBM. Pumping enough to have a bottle of EBM for partner to feed overnight.   Lochia ceased at 5 weeks postpartum.    Bowel function is normal with occasional days without BM.   Bladder function is normal.     She has not resumed intercourse.   Desired contraception: condoms.     Mood: Reports feeling well overall, tired because baby is not sleeping long stretches at night. Feels that time is going quickly with her baby!    Other Concerns: None    ================================================================  ROS: 10 point ROS neg other than the symptoms noted above in the HPI.   BREAST: engorgement over past few days    EXAM:  /73   Pulse 75   Ht 1.6 m (5' 2.99\")   Wt 62.6 kg (138 lb)   LMP 2022 (Exact Date)   Breastfeeding Yes   BMI 24.45 kg/m      General: healthy, alert and no distress  Psych: NEGATIVE  Breasts:  Lactating, Nipples intact with no lesions and No S/S of yeast or mastitis  Abdomen: Benign, Soft, flat, non-tender and Diastasis less than 1-2 FB  Incision:  well healed   Vulva:  Normal genitalia    Uterus:  fully involuted and non-tender      ASSESSMENT:   Encounter Diagnoses   Name Primary?    Routine postpartum follow-up Yes    Dermoid cyst       Normal postpartum exam after   Pregnancy was " complicated by:  Gestational Diabetes - insulin controlled and Gestational hypertension.      PLAN:  Orders Placed This Encounter   Procedures    US Transvaginal Pelvic Non-OB      Orders Placed This Encounter   Medications    Vitamin D, Cholecalciferol, 25 MCG (1000 UT) CAPS        Risks and benefits of prescribed medications discussed.  Medication instructions reviewed.  Discussed calcium intake, vitamins and supplements including Vitamin D  Exercise encouraged  Discussed f/u US for dermoid cyst found during pregnancy  Discussed engorgement management techniques  Plans to go to lab after appointment for 2 hr GTT  Follow up in 1 year  Increase fiber intake  Increase fluid intake    I, Fina Smith, completed the PFSH and ROS. I then acted as a scribe for CNM for the remainder of the visit. - Fina Smith RN SNM    I agree with the PFSH and ROS as completed by the SNM, except for changes made by me. The remainder of the encounter was performed by me and scribed by the SNM. The scribed note accurately reflects my personal services and decisions made by me.  FIDENCIO Nieto CNM        Again, thank you for allowing me to participate in the care of your patient.      Sincerely,    FIDENCIO Nieto CNM

## 2023-03-31 NOTE — PROGRESS NOTES
"Elham Agarwal is a 33 year old female who is 6 weeks postpartum following a NSVB.    The delivery was at 39 weeks gestation   Her baby girl  weighed 5 lbs 12 oz at birth.     Postpartum course has been complicated by Gestational Hypertension in the postpartum period that has resolved; need for 2 hour GTT d/t GDMa2.   Baby is feeding by Breast and bottle with EBM. Pumping enough to have a bottle of EBM for partner to feed overnight.   Lochia ceased at 5 weeks postpartum.    Bowel function is normal with occasional days without BM.   Bladder function is normal.     She has not resumed intercourse.   Desired contraception: condoms.     Mood: Reports feeling well overall, tired because baby is not sleeping long stretches at night. Feels that time is going quickly with her baby!    Other Concerns: None    ================================================================  ROS: 10 point ROS neg other than the symptoms noted above in the HPI.   BREAST: engorgement over past few days    EXAM:  /73   Pulse 75   Ht 1.6 m (5' 2.99\")   Wt 62.6 kg (138 lb)   LMP 2022 (Exact Date)   Breastfeeding Yes   BMI 24.45 kg/m      General: healthy, alert and no distress  Psych: NEGATIVE  Breasts:  Lactating, Nipples intact with no lesions and No S/S of yeast or mastitis  Abdomen: Benign, Soft, flat, non-tender and Diastasis less than 1-2 FB  Incision:  well healed   Vulva:  Normal genitalia    Uterus:  fully involuted and non-tender      ASSESSMENT:   Encounter Diagnoses   Name Primary?     Routine postpartum follow-up Yes     Dermoid cyst       Normal postpartum exam after   Pregnancy was complicated by:  Gestational Diabetes - insulin controlled and Gestational hypertension.      PLAN:  Orders Placed This Encounter   Procedures     US Transvaginal Pelvic Non-OB      Orders Placed This Encounter   Medications     Vitamin D, Cholecalciferol, 25 MCG (1000 UT) CAPS        Risks and benefits of prescribed " medications discussed.  Medication instructions reviewed.  Discussed calcium intake, vitamins and supplements including Vitamin D  Exercise encouraged  Discussed f/u US for dermoid cyst found during pregnancy  Discussed engorgement management techniques  Plans to go to lab after appointment for 2 hr GTT  Follow up in 1 year  Increase fiber intake  Increase fluid intake    I, Fina Smith, completed the PFSH and ROS. I then acted as a scribe for CNM for the remainder of the visit. - Fina Smith, RN SNM    I agree with the PFSH and ROS as completed by the SNM, except for changes made by me. The remainder of the encounter was performed by me and scribed by the SNM. The scribed note accurately reflects my personal services and decisions made by me.  FIDENCIO NietoM

## 2023-04-07 ENCOUNTER — LAB (OUTPATIENT)
Dept: LAB | Facility: CLINIC | Age: 33
End: 2023-04-07
Payer: COMMERCIAL

## 2023-04-07 DIAGNOSIS — O24.414 GESTATIONAL DIABETES MELLITUS (GDM) REQUIRING INSULIN: ICD-10-CM

## 2023-04-07 LAB
NON GESTATIONAL GTT 2 HR POST DOSE: 119 MG/DL (ref 60–199)
NON GESTATIONAL GTT FASTING: 88 MG/DL (ref 60–125)

## 2023-04-07 PROCEDURE — 82947 ASSAY GLUCOSE BLOOD QUANT: CPT

## 2023-04-07 PROCEDURE — 82950 GLUCOSE TEST: CPT

## 2023-04-07 PROCEDURE — 36415 COLL VENOUS BLD VENIPUNCTURE: CPT

## 2023-05-04 ENCOUNTER — ANCILLARY PROCEDURE (OUTPATIENT)
Dept: ULTRASOUND IMAGING | Facility: CLINIC | Age: 33
End: 2023-05-04
Attending: ADVANCED PRACTICE MIDWIFE
Payer: COMMERCIAL

## 2023-05-04 ENCOUNTER — OFFICE VISIT (OUTPATIENT)
Dept: OBGYN | Facility: CLINIC | Age: 33
End: 2023-05-04
Attending: OBSTETRICS & GYNECOLOGY
Payer: COMMERCIAL

## 2023-05-04 VITALS
SYSTOLIC BLOOD PRESSURE: 115 MMHG | HEIGHT: 63 IN | WEIGHT: 145.1 LBS | DIASTOLIC BLOOD PRESSURE: 74 MMHG | HEART RATE: 76 BPM | BODY MASS INDEX: 25.71 KG/M2

## 2023-05-04 DIAGNOSIS — N83.201 RIGHT OVARIAN CYST: Primary | ICD-10-CM

## 2023-05-04 DIAGNOSIS — D36.9 DERMOID CYST: ICD-10-CM

## 2023-05-04 PROCEDURE — G0463 HOSPITAL OUTPT CLINIC VISIT: HCPCS | Mod: 25 | Performed by: OBSTETRICS & GYNECOLOGY

## 2023-05-04 PROCEDURE — 76830 TRANSVAGINAL US NON-OB: CPT

## 2023-05-04 PROCEDURE — 99213 OFFICE O/P EST LOW 20 MIN: CPT | Performed by: OBSTETRICS & GYNECOLOGY

## 2023-05-04 PROCEDURE — 76830 TRANSVAGINAL US NON-OB: CPT | Mod: 26 | Performed by: OBSTETRICS & GYNECOLOGY

## 2023-05-04 NOTE — PROGRESS NOTES
"OB/GYN CLINIC VISIT    HPI:  Andres is a 33 year old female  that presents today for follow-up ultrasound of a dermoid cyst discovered in pregnancy.    She delivered a baby girl on 2023 at 39 weeks gestation via . Pregnancy was complicated by gestational diabetes and 3rd trimester gHTN. Baby is exclusively fed with mother's milk, both via bottle and breast.    ROS:  Gastrointestinal: Denies nausea and vomiting  Breast: Denies breast pain.  Genitourinary: no abnormal vaginal discharge; no menstruation  Sexual Function: Has resumed sexual activity with  and denies concerns  Musculoskeletal: Has started doing some Pilates 20 min, ~3x/wk with some abdominal muscle pain, but doing well overall    PROBLEM LIST:  Patient Active Problem List   Diagnosis     Dermoid cyst     High-risk pregnancy, unspecified trimester     Vitamin D deficiency     Gestational diabetes mellitus (GDM) requiring insulin     Labor and delivery, indication for care     Gestational hypertension, third trimester     OB/GYN HISTORY:   OB History    Para Term  AB Living   1 1 1 0 0 1   SAB IAB Ectopic Multiple Live Births   0 0 0 0 1      # Outcome Date GA Lbr Joss/2nd Weight Sex Delivery Anes PTL Lv   1 Term 23 39w5d 21:15 / 00:09 2.62 kg (5 lb 12.4 oz) F Vag-Spont EPI N ANAM      Complications: Preeclampsia/Hypertension      Name: ANDRES HARRIS      Apgar1: 8  Apgar5: 9       PAST MEDICAL HISTORY:  Past Medical History:   Diagnosis Date     Gestational diabetes      Varicose veins of lower extremity        MEDICATIONS:  Current Outpatient Medications   Medication Sig Dispense Refill     Prenatal Vit-Fe Fumarate-FA (PRENATAL MULTIVITAMIN W/IRON) 27-0.8 MG tablet Take 1 tablet by mouth daily       Vitamin D, Cholecalciferol, 25 MCG (1000 UT) CAPS          ALLERGIES:  Misc. sulfonamide containing compounds and Penicillins    VITALS:  Blood pressure 115/74, pulse 76, height 1.6 m (5' 2.99\"), weight " 65.8 kg (145 lb 1.6 oz), last menstrual period 2022, currently breastfeeding.    IMAGING:  US Pelvic: Impression: Right ovary with stable 1.7 cm cyst from , likely consistent with a dermoid cyst.  Small calcifications within the left ovary of uncertain significance.  Further studies as clinically indicated.   Assessment:  Elham is a 33 year old female , 11 weeks post-partum, here for follow-up of dermoid cyst discovered in pregnancy. On ultrasound the cyst is not significantly changed from prior. The patient denies any lower abdominal pain or discomfort.    Plan:  -Reviewed dermoid cyst, small size and recommend surveillance with symptoms. No follow up US indicated unless she develops pain. Discussed that surgery would be reserved for symptomatic or larger cysts. She is in agreement with plan.     This patient was seen and discussed with my attending, Dr. Aguilar.    Ben Guerrero, MS4    Physician Attestation   I, Raysa Aguilar MD, was present with the medical/AZIZA student who participated in the service and in the documentation of the note.  I have verified the history and personally performed the physical exam and medical decision making.  I agree with the assessment and plan of care as documented in the note.      Items personally reviewed: imaging and agree with the interpretation documented in the note.    Raysa Aguilar MD

## 2023-05-04 NOTE — PATIENT INSTRUCTIONS
Thank you for trusting us with your care!     If you need to contact us for questions about:  Symptoms, Scheduling & Medical Questions; Non-urgent (2-3 day response) Cari message, Urgent (needing response today) [phone] (if after 3:30pm next day response)   Prescriptions: Please call your Pharmacy   Billing: Danish [phone] or [facility]:[phone]

## 2023-05-04 NOTE — LETTER
2023       RE: Andres Agarwal  [address]  [facility] [address]     Dear Colleague,    Thank you for referring your patient, Andres Agarwal, to the [facility] [place] at [facility]. Please see a copy of my visit note below.    OB/GYN CLINIC VISIT    HPI:  Andres is a 33 year old female  that presents today for follow-up ultrasound of a dermoid cyst discovered in pregnancy.    She delivered a baby girl on 2023 at 39 weeks gestation via . Pregnancy was complicated by gestational diabetes and 3rd trimester gHTN. Baby is exclusively fed with mother's milk, both via bottle and breast.    ROS:  Gastrointestinal: Denies nausea and vomiting  Breast: Denies breast pain.  Genitourinary: no abnormal vaginal discharge; no menstruation  Sexual Function: Has resumed sexual activity with  and denies concerns  Musculoskeletal: Has started doing some Pilates 20 min, ~3x/wk with some abdominal muscle pain, but doing well overall    PROBLEM LIST:  Patient Active Problem List   Diagnosis    Dermoid cyst    High-risk pregnancy, unspecified trimester    Vitamin D deficiency    Gestational diabetes mellitus (GDM) requiring insulin    Labor and delivery, indication for care    Gestational hypertension, third trimester     OB/GYN HISTORY:   OB History    Para Term  AB Living   1 1 1 0 0 1   SAB IAB Ectopic Multiple Live Births   0 0 0 0 1      # Outcome Date GA Lbr Joss/2nd Weight Sex Delivery Anes PTL Lv   1 Term 23 39w5d 21:15 / 00:09 2.62 kg (5 lb 12.4 oz) F Vag-Spont EPI N ANAM      Complications: Preeclampsia/Hypertension      Name: ANDRES HARRIS      Apgar1: 8  Apgar5: 9       PAST MEDICAL HISTORY:  Past Medical History:   Diagnosis Date    Gestational diabetes     Varicose veins of lower extremity        MEDICATIONS:  Current Outpatient Medications   Medication Sig Dispense Refill  "   Prenatal Vit-Fe Fumarate-FA (PRENATAL MULTIVITAMIN W/IRON) 27-0.8 MG tablet Take 1 tablet by mouth daily      Vitamin D, Cholecalciferol, 25 MCG (1000 UT) CAPS          ALLERGIES:  Misc. sulfonamide containing compounds and Penicillins    VITALS:  Blood pressure 115/74, pulse 76, height 1.6 m (5' 2.99\"), weight 65.8 kg (145 lb 1.6 oz), last menstrual period 2022, currently breastfeeding.    IMAGING:  US Pelvic: Impression: Right ovary with stable 1.7 cm cyst from , likely consistent with a dermoid cyst.  Small calcifications within the left ovary of uncertain significance.  Further studies as clinically indicated.   Assessment:  Elham is a 33 year old female , 11 weeks post-partum, here for follow-up of dermoid cyst discovered in pregnancy. On ultrasound the cyst is not significantly changed from prior. The patient denies any lower abdominal pain or discomfort.    Plan:  -Reviewed dermoid cyst, small size and recommend surveillance with symptoms. No follow up US indicated unless she develops pain. Discussed that surgery would be reserved for symptomatic or larger cysts. She is in agreement with plan.     This patient was seen and discussed with my attending, Dr. Aguilar.    Ben Guerrero, MS4    Physician Attestation   I, Raysa Aguilar MD, was present with the medical/AZIZA student who participated in the service and in the documentation of the note.  I have verified the history and personally performed the physical exam and medical decision making.  I agree with the assessment and plan of care as documented in the note.      Items personally reviewed: imaging and agree with the interpretation documented in the note.    Raysa Aguilar MD      "

## 2023-05-05 PROBLEM — D36.9 DERMOID CYST: Status: ACTIVE | Noted: 2022-08-08

## 2023-07-25 ENCOUNTER — OFFICE VISIT (OUTPATIENT)
Dept: URGENT CARE | Facility: URGENT CARE | Age: 33
End: 2023-07-25
Payer: COMMERCIAL

## 2023-07-25 VITALS
DIASTOLIC BLOOD PRESSURE: 72 MMHG | WEIGHT: 144 LBS | HEART RATE: 70 BPM | OXYGEN SATURATION: 98 % | TEMPERATURE: 97.8 F | SYSTOLIC BLOOD PRESSURE: 103 MMHG | BODY MASS INDEX: 25.52 KG/M2

## 2023-07-25 DIAGNOSIS — J06.9 UPPER RESPIRATORY TRACT INFECTION, UNSPECIFIED TYPE: Primary | ICD-10-CM

## 2023-07-25 PROCEDURE — 87635 SARS-COV-2 COVID-19 AMP PRB: CPT | Performed by: FAMILY MEDICINE

## 2023-07-25 PROCEDURE — 99213 OFFICE O/P EST LOW 20 MIN: CPT | Performed by: FAMILY MEDICINE

## 2023-07-25 RX ORDER — MONTELUKAST SODIUM 10 MG/1
TABLET ORAL
COMMUNITY
Start: 2023-07-22

## 2023-07-25 RX ORDER — NAPROXEN 500 MG/1
TABLET ORAL
COMMUNITY
Start: 2023-07-22

## 2023-07-26 LAB — SARS-COV-2 RNA RESP QL NAA+PROBE: POSITIVE

## 2023-07-26 NOTE — PROGRESS NOTES
"SUBJECTIVE: Elham Agarwal is a 33 year old female presenting with a chief complaint of \"cold symptoms\".  Onset of symptoms was 3 day(s) ago.  Predisposing factors include ill contact: Family member .    Past Medical History:   Diagnosis Date    Gestational diabetes 2022    Varicose veins of lower extremity 2008     Allergies   Allergen Reactions    Misc. Sulfonamide Containing Compounds Dermatitis and Rash    Penicillins Dermatitis, Itching and Rash     Social History     Tobacco Use    Smoking status: Never    Smokeless tobacco: Never   Substance Use Topics    Alcohol use: Not Currently       ROS:  SKIN: no rash  GI: no vomiting    OBJECTIVE:  /72   Pulse 70   Temp 97.8  F (36.6  C) (Oral)   Wt 65.3 kg (144 lb)   SpO2 98%   BMI 25.52 kg/m  GENERAL APPEARANCE: healthy, alert and no distress  EYES: EOMI,  PERRL, conjunctiva clear  HENT: ear canals and TM's normal.  Nose and mouth without ulcers, erythema or lesions  RESP: lungs clear to auscultation - no rales, rhonchi or wheezes  SKIN: no suspicious lesions or rashes      ICD-10-CM    1. Upper respiratory tract infection, unspecified type  J06.9 Symptomatic COVID-19 Virus (Coronavirus) by PCR Nose        Fluids/Rest, f/u if worse/not any better      "

## 2023-08-13 ENCOUNTER — HEALTH MAINTENANCE LETTER (OUTPATIENT)
Age: 33
End: 2023-08-13

## 2024-10-06 ENCOUNTER — HEALTH MAINTENANCE LETTER (OUTPATIENT)
Age: 34
End: 2024-10-06